# Patient Record
Sex: MALE | Race: WHITE | NOT HISPANIC OR LATINO | Employment: UNEMPLOYED | URBAN - NONMETROPOLITAN AREA
[De-identification: names, ages, dates, MRNs, and addresses within clinical notes are randomized per-mention and may not be internally consistent; named-entity substitution may affect disease eponyms.]

---
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
## 2018-04-26 RX ORDER — INSULIN GLARGINE 100 [IU]/ML
INJECTION, SOLUTION SUBCUTANEOUS
Qty: 5 PEN | Refills: 5 | Status: SHIPPED | OUTPATIENT
Start: 2018-04-26 | End: 2018-11-29 | Stop reason: SDUPTHER

## 2018-07-05 ENCOUNTER — OFFICE VISIT (OUTPATIENT)
Dept: FAMILY MEDICINE CLINIC | Facility: CLINIC | Age: 18
End: 2018-07-05

## 2018-07-05 VITALS
DIASTOLIC BLOOD PRESSURE: 74 MMHG | HEART RATE: 81 BPM | HEIGHT: 66 IN | OXYGEN SATURATION: 99 % | SYSTOLIC BLOOD PRESSURE: 122 MMHG | BODY MASS INDEX: 22.03 KG/M2 | WEIGHT: 137.1 LBS

## 2018-07-05 DIAGNOSIS — E10.8 TYPE 1 DIABETES MELLITUS WITH COMPLICATION (HCC): ICD-10-CM

## 2018-07-05 DIAGNOSIS — R56.9 SEIZURES (HCC): ICD-10-CM

## 2018-07-05 DIAGNOSIS — Z76.89 ESTABLISHING CARE WITH NEW DOCTOR, ENCOUNTER FOR: Primary | ICD-10-CM

## 2018-07-05 PROCEDURE — 99203 OFFICE O/P NEW LOW 30 MIN: CPT | Performed by: STUDENT IN AN ORGANIZED HEALTH CARE EDUCATION/TRAINING PROGRAM

## 2018-07-05 PROCEDURE — 90471 IMMUNIZATION ADMIN: CPT | Performed by: STUDENT IN AN ORGANIZED HEALTH CARE EDUCATION/TRAINING PROGRAM

## 2018-07-05 PROCEDURE — 90651 9VHPV VACCINE 2/3 DOSE IM: CPT | Performed by: STUDENT IN AN ORGANIZED HEALTH CARE EDUCATION/TRAINING PROGRAM

## 2018-07-05 NOTE — PROGRESS NOTES
ID: Tomas Novak    CC:   Chief Complaint   Patient presents with   • Establish Care     sesonal allergies    • Diabetes       Subjective:     Tomas Novak is a 17 y.o. male who presents for establish care with new doctor.    Patient is a 17-year-old male who has had type 1 diabetes since the age of 3.  He also has a history of seizures which started at 4, but he has not had any seizures since 6 years of age.  Patient is requesting a referral to neurology.  Patient has not had a diabetic eye exam for over a year.  Plan to refer to ophthalmology.  Patient states his diet mainly consists of frozen pizzas and pizza rolls.  He leads a sedentary lifestyle with minimal exercise.  Patient had abdominal pain couple weeks ago, but it resolved after he took a laxative and has not had any abdominal discomfort since then.      Diabetes   He presents for his initial diabetic visit. He has type 1 diabetes mellitus. No MedicAlert identification noted. The initial diagnosis of diabetes was made 14 years ago. His disease course has been stable. There are no hypoglycemic associated symptoms. Pertinent negatives for hypoglycemia include no confusion, dizziness, headaches, nervousness/anxiousness or seizures. There are no diabetic associated symptoms. Pertinent negatives for diabetes include no blurred vision, no chest pain, no fatigue, no foot paresthesias, no foot ulcerations, no polydipsia, no polyphagia, no polyuria, no visual change and no weakness. There are no hypoglycemic complications. Pertinent negatives for hypoglycemia complications include no blackouts, no nocturnal hypoglycemia and no required glucagon injection. Symptoms are stable. There are no diabetic complications. Pertinent negatives for diabetic complications include no autonomic neuropathy, peripheral neuropathy or retinopathy. Risk factors for coronary artery disease include diabetes mellitus, male sex and sedentary lifestyle. Current diabetic treatment  includes insulin injections. He is compliant with treatment all of the time. His weight is stable. He is following a high fat/cholesterol, generally unhealthy and high salt diet. When asked about meal planning, he reported none. He has not had a previous visit with a dietitian. He rarely participates in exercise. There is no change in his home blood glucose trend. He does not see a podiatrist.Eye exam is not current.        Preventative:  Over the past 2 weeks, have you felt down, depressed, or hopeless?No   Over the past 2 weeks, have you felt little interest or pleasure in doing things?Yes  Clinical depression screening refused by patient.No     On osteoporosis therapy?No     PHQ-9 Depression Screening  Little interest or pleasure in doing things? 1   Feeling down, depressed, or hopeless? 0   Trouble falling or staying asleep, or sleeping too much? 0   Feeling tired or having little energy? 2   Poor appetite or overeating? 0   Feeling bad about yourself - or that you are a failure or have let yourself or your family down? 2   Trouble concentrating on things, such as reading the newspaper or watching television? 0   Moving or speaking so slowly that other people could have noticed? Or the opposite - being so fidgety or restless that you have been moving around a lot more than usual? 0   Thoughts that you would be better off dead, or of hurting yourself in some way? 0   PHQ-9 Total Score 5   If you checked off any problems, how difficult have these problems made it for you to do your work, take care of things at home, or get along with other people? Somewhat difficult       Past Medical Hx:  Past Medical History:   Diagnosis Date   • Seizure disorder (CMS/[facility]) 1/5/2017   • Type 1 diabetes mellitus without complication (CMS/[facility]) 1/5/2017   • Vitamin D deficiency 1/5/2017       Past Surgical Hx:  Past Surgical History:   Procedure Laterality Date   • NO PAST SURGERIES         Health Maintenance:  Health Maintenance    Topic Date Due   • HPV VACCINES (1 of 3 - Male 3 Dose Series) 07/20/2009   • MENINGOCOCCAL VACCINE (Normal Risk) (1 of 1) 07/20/2014   • DIABETIC EYE EXAM  04/05/2017   • PNEUMOCOCCAL VACCINE (19-64 MEDIUM RISK) (1 of 1 - PPSV23) 07/20/2017   • TDAP/TD VACCINES (1 - Tdap) 07/20/2017   • INFLUENZA VACCINE  08/01/2018   • URINE MICROALBUMIN  10/20/2018   • HEMOGLOBIN A1C  01/05/2019   • DIABETIC FOOT EXAM  07/05/2019   • ANNUAL PHYSICAL  07/06/2019       Current Meds:    Current Outpatient Prescriptions:   •  glucose blood (ACCU-CHEK PATT) test strip, 8 x daily, Disp: 240 each, Rfl: 11  •  insulin aspart (NOVOLOG FLEXPEN) 100 UNIT/ML solution pen-injector sc pen, Inject 25 Units under the skin 3 (Three) Times a Day With Meals., Disp: 8 pen, Rfl: 11  •  Insulin Glargine (BASAGLAR KWIKPEN) 100 UNIT/ML injection pen, INECT 30 UNITS UNDER THE SKIN DAILY AS DIRECTED, Disp: 5 pen, Rfl: 5  •  Insulin Glargine (TOUJEO SOLOSTAR) 300 UNIT/ML solution pen-injector, Inject 30 Units under the skin Daily. Or tresiba or lantus, Disp: 1 pen, Rfl: 11  •  levETIRAcetam (KEPPRA) 500 MG tablet, Take 1 tablet by mouth Daily., Disp: 30 tablet, Rfl: 11  •  insulin aspart (novoLOG) 100 UNIT/ML injection, Inject  under the skin., Disp: , Rfl:     Allergies:  Patient has no known allergies.    Family Hx:  Family History   Problem Relation Age of Onset   • Diabetes type II Father         Social History:  Social History     Social History   • Marital status: Unknown     Spouse name: N/A   • Number of children: N/A   • Years of education: N/A     Occupational History   • Not on file.     Social History Main Topics   • Smoking status: Never Smoker   • Smokeless tobacco: Never Used   • Alcohol use Not on file   • Drug use: Unknown   • Sexual activity: Not on file     Other Topics Concern   • Not on file     Social History Narrative   • No narrative on file       Review of Systems   Constitutional: Negative for activity change, appetite change,  "chills, diaphoresis, fatigue and fever.   HENT: Negative for congestion, ear pain, rhinorrhea, sneezing and sore throat.    Eyes: Negative for blurred vision, pain, redness, itching and visual disturbance.   Respiratory: Negative for cough, choking, chest tightness, shortness of breath, wheezing and stridor.    Cardiovascular: Negative for chest pain, palpitations and leg swelling.   Gastrointestinal: Negative for abdominal distention, abdominal pain, blood in stool, constipation, diarrhea, nausea, rectal pain and vomiting.   Endocrine: Negative for polydipsia, polyphagia and polyuria.   Genitourinary: Negative for difficulty urinating, dysuria, flank pain and frequency.   Skin: Negative for color change and rash.   Neurological: Negative for dizziness, seizures, syncope, weakness, light-headedness, numbness and headaches.   Psychiatric/Behavioral: Negative for agitation, confusion, decreased concentration and sleep disturbance. The patient is not nervous/anxious.    All other systems reviewed and are negative.      Objective:     /74 (BP Location: Left arm, Patient Position: Sitting, Cuff Size: Adult)   Pulse 81   Ht 167.6 cm (66\")   Wt 62.2 kg (137 lb 1.6 oz)   SpO2 99%   BMI 22.13 kg/m²     Physical Exam   Constitutional: He is oriented to person, place, and time. He appears well-developed and well-nourished.  Non-toxic appearance. He does not have a sickly appearance. He does not appear ill. No distress.   HENT:   Head: Normocephalic and atraumatic.   Right Ear: External ear normal. No lacerations. No swelling or tenderness.   Left Ear: External ear normal. No lacerations. No swelling or tenderness.   Nose: Nose normal.   Mouth/Throat: Uvula is midline, oropharynx is clear and moist and mucous membranes are normal.   Eyes: Conjunctivae, EOM and lids are normal. Pupils are equal, round, and reactive to light. No scleral icterus.   Neck: No tracheal deviation present. No thyromegaly present. "   Cardiovascular: Normal rate, regular rhythm, normal heart sounds and intact distal pulses.  Exam reveals no gallop, no distant heart sounds and no friction rub.    No murmur heard.  Pulses:       Carotid pulses are 2+ on the right side, and 2+ on the left side.       Radial pulses are 2+ on the right side, and 2+ on the left side.        Dorsalis pedis pulses are 2+ on the right side, and 2+ on the left side.        Posterior tibial pulses are 2+ on the right side, and 2+ on the left side.   Pulmonary/Chest: Effort normal and breath sounds normal. No accessory muscle usage or stridor. No respiratory distress. He has no decreased breath sounds. He has no wheezes. He has no rhonchi. He has no rales. He exhibits no tenderness.   Abdominal: Soft. Bowel sounds are normal. He exhibits no shifting dullness, no distension and no ascites. There is no tenderness. There is no rigidity, no rebound and no guarding.   Musculoskeletal:        Right lower leg: He exhibits no swelling.        Left lower leg: He exhibits no swelling.        Right foot: There is no swelling.        Left foot: There is no swelling.    Tomas had a diabetic foot exam performed today.   During the foot exam he had a monofilament test performed.    Neurological Sensory Findings - Unaltered hot/cold right ankle/foot discrimination and unaltered hot/cold left ankle/foot discrimination. Unaltered sharp/dull right ankle/foot discrimination and unaltered sharp/dull left ankle/foot discrimination. No right ankle/foot altered proprioception and no left ankle/foot altered proprioception  Vascular Status -  His right foot exhibits normal foot vasculature  and no edema. His left foot exhibits normal foot vasculature  and no edema.  Lymphadenopathy:     He has no cervical adenopathy.   Neurological: He is alert and oriented to person, place, and time. He has normal strength. No cranial nerve deficit or sensory deficit. He exhibits normal muscle tone. GCS eye  subscore is 4. GCS verbal subscore is 5. GCS motor subscore is 6.   Skin: Skin is warm and dry. No rash noted. He is not diaphoretic. No erythema. No pallor.   Psychiatric: He has a normal mood and affect. His speech is normal.   Nursing note and vitals reviewed.       Assessment/Plan:     Tomas was seen today for establish care and diabetes.    Diagnoses and all orders for this visit:    Establishing care with new doctor, encounter for    Type 1 diabetes mellitus with complication (CMS/[facility])  -     Ambulatory Referral for Diabetic Eye Exam-Ophthalmology  -     Basic Metabolic Panel  -     Hemoglobin A1c    Seizures (CMS/[facility])  -     Ambulatory Referral to Neurology          Follow-up:     Return in about 4 weeks (around 8/2/2018) for Recheck.      Goals: keep doc appointments  Barriers to goals: pt compliance    Health Maintenance   Topic Date Due   • HPV VACCINES (1 of 3 - Male 3 Dose Series) 07/20/2009   • MENINGOCOCCAL VACCINE (Normal Risk) (1 of 1) 07/20/2014   • DIABETIC EYE EXAM  04/05/2017   • PNEUMOCOCCAL VACCINE (19-64 MEDIUM RISK) (1 of 1 - PPSV23) 07/20/2017   • TDAP/TD VACCINES (1 - Tdap) 07/20/2017   • INFLUENZA VACCINE  08/01/2018   • URINE MICROALBUMIN  10/20/2018   • HEMOGLOBIN A1C  01/05/2019   • DIABETIC FOOT EXAM  07/05/2019   • ANNUAL PHYSICAL  07/06/2019   --Patient is going to check his immunization records at home to see whether he is up-to-date on his meningococcal, TDAP, and pneumococcal vaccines.--    Tobacco: nonsmoker  Alcohol: does not drink  Lifestyle: Body mass index is 22.13 kg/m². eat more fruits and vegetables, decrease soda or juice intake, increase water intake, increase physical activity, reduce screen time, reduce portion size, cut out extra servings, reduce fast food intake, family to eat at dinner table more often, keep TV off during meals, plan meals, eat breakfast and have 3 meals a day    RISK SCORE: 3        This document has been electronically signed by Eitan Hutchison,  MD on July 5, 2018 11:16 AM

## 2018-07-09 NOTE — PROGRESS NOTES
I have seen the patient.  I have reviewed the notes, assessments, and/or procedures performed by Dr. Hutchison, I concur with her/his documentation and assessment and plan for Tomas Kellermino.       This document has been electronically signed by Madhu Meléndez MD on July 9, 2018 9:38 AM

## 2018-10-22 RX ORDER — INSULIN LISPRO 100 [IU]/ML
INJECTION, SOLUTION INTRAVENOUS; SUBCUTANEOUS
Qty: 30 ML | Refills: 0 | OUTPATIENT
Start: 2018-10-22

## 2018-10-22 RX ORDER — LEVETIRACETAM 500 MG/1
500 TABLET ORAL DAILY
Qty: 30 TABLET | Refills: 0 | OUTPATIENT
Start: 2018-10-22

## 2018-10-30 ENCOUNTER — TELEPHONE (OUTPATIENT)
Dept: FAMILY MEDICINE CLINIC | Facility: CLINIC | Age: 18
End: 2018-10-30

## 2018-10-30 RX ORDER — INSULIN ASPART 100 [IU]/ML
INJECTION, SOLUTION INTRAVENOUS; SUBCUTANEOUS
Qty: 15 ML | Refills: 0 | Status: SHIPPED | OUTPATIENT
Start: 2018-10-30 | End: 2018-11-29 | Stop reason: SDUPTHER

## 2018-11-05 RX ORDER — LEVETIRACETAM 500 MG/1
500 TABLET ORAL DAILY
Qty: 30 TABLET | Refills: 0 | Status: SHIPPED | OUTPATIENT
Start: 2018-11-05 | End: 2018-11-09 | Stop reason: SDUPTHER

## 2018-11-09 ENCOUNTER — OFFICE VISIT (OUTPATIENT)
Dept: FAMILY MEDICINE CLINIC | Facility: CLINIC | Age: 18
End: 2018-11-09

## 2018-11-09 VITALS
OXYGEN SATURATION: 97 % | HEIGHT: 66 IN | WEIGHT: 136.1 LBS | SYSTOLIC BLOOD PRESSURE: 110 MMHG | DIASTOLIC BLOOD PRESSURE: 68 MMHG | HEART RATE: 68 BPM | BODY MASS INDEX: 21.87 KG/M2

## 2018-11-09 DIAGNOSIS — G40.909 SEIZURE DISORDER (HCC): Primary | ICD-10-CM

## 2018-11-09 PROCEDURE — 99213 OFFICE O/P EST LOW 20 MIN: CPT | Performed by: STUDENT IN AN ORGANIZED HEALTH CARE EDUCATION/TRAINING PROGRAM

## 2018-11-09 RX ORDER — LEVETIRACETAM 500 MG/1
500 TABLET ORAL DAILY
Qty: 30 TABLET | Refills: 0 | Status: SHIPPED | OUTPATIENT
Start: 2018-11-09 | End: 2018-12-06 | Stop reason: SDUPTHER

## 2018-11-09 NOTE — PROGRESS NOTES
I have seen the patient.  I have reviewed the notes, assessments, and/or procedures performed by Eitan Hutchison MD , I concur with her/his documentation and assessment and plan for Tomas Padron Scarlet.          This document has been electronically signed by Ragini Lee MD on November 9, 2018 2:29 PM

## 2018-11-09 NOTE — PROGRESS NOTES
"ID: Tomas Novak    CC:   Chief Complaint   Patient presents with   • Seizures     Weakness: needs Keppra refilled today        Subjective:     Tomas Novak is a 17 y.o. male who presents for establish care with new doctor.    Patient presents to clinic for a refill of his seizure medication.  He's been on this medication for \"many years \".  He has not had a seizure for \"a long time\".  When probed further, patient states he has not had a seizure for years.  He first noticed his supply of Keppra running low one week ago.  That's when he switched to taking the medication every other day, rather than daily.  Patient ran out of his Keppra yesterday.  He reports that he can usually go 2 days off this medication before he has a seizure.    Endocrinology is managing his type 1 diabetes.         Preventative:  Over the past 2 weeks, have you felt down, depressed, or hopeless?No   Over the past 2 weeks, have you felt little interest or pleasure in doing things?No  Clinical depression screening refused by patient.No     On osteoporosis therapy?No     Past Medical Hx:  Past Medical History:   Diagnosis Date   • Seizure disorder (CMS/[facility]) 1/5/2017   • Type 1 diabetes mellitus without complication (CMS/[facility]) 1/5/2017   • Vitamin D deficiency 1/5/2017       Past Surgical Hx:  Past Surgical History:   Procedure Laterality Date   • NO PAST SURGERIES         Health Maintenance:  Health Maintenance   Topic Date Due   • MENINGOCOCCAL VACCINE (Normal Risk) (1 of 1 - 2-dose series) 07/20/2014   • TDAP/TD VACCINES (1 - Tdap) 07/20/2017   • URINE MICROALBUMIN  10/20/2018   • HPV VACCINES (2 of 2 - Male 2-dose series) 01/05/2019   • PNEUMOCOCCAL VACCINE (19-64 MEDIUM RISK) (1 of 1 - PPSV23) 11/08/2019 (Originally 7/20/2017)   • HEMOGLOBIN A1C  01/05/2019   • DIABETIC FOOT EXAM  07/05/2019   • ANNUAL PHYSICAL  07/06/2019   • DIABETIC EYE EXAM  10/24/2019   • INFLUENZA VACCINE  Addressed       Current Meds:    Current " Outpatient Prescriptions:   •  glucose blood (ACCU-CHEK PATT) test strip, 8 x daily, Disp: 240 each, Rfl: 11  •  insulin aspart (novoLOG) 100 UNIT/ML injection, Inject  under the skin., Disp: , Rfl:   •  Insulin Glargine (BASAGLAR KWIKPEN) 100 UNIT/ML injection pen, INECT 30 UNITS UNDER THE SKIN DAILY AS DIRECTED, Disp: 5 pen, Rfl: 5  •  Insulin Glargine (TOUJEO SOLOSTAR) 300 UNIT/ML solution pen-injector, Inject 30 Units under the skin Daily. Or tresiba or lantus, Disp: 1 pen, Rfl: 11  •  levETIRAcetam (KEPPRA) 500 MG tablet, Take 1 tablet by mouth Daily., Disp: 30 tablet, Rfl: 0  •  NOVOLOG FLEXPEN 100 UNIT/ML solution pen-injector sc pen, ADMINISTER 25 UNITS UNDER THE SKIN THREE TIMES DAILY WITH MEALS, Disp: 15 mL, Rfl: 0    Allergies:  Patient has no known allergies.    Family Hx:  Family History   Problem Relation Age of Onset   • Diabetes type II Father         Social History:  Social History     Social History   • Marital status: Unknown     Spouse name: N/A   • Number of children: N/A   • Years of education: N/A     Occupational History   • Not on file.     Social History Main Topics   • Smoking status: Never Smoker   • Smokeless tobacco: Never Used   • Alcohol use Defer   • Drug use: Unknown   • Sexual activity: Defer     Other Topics Concern   • Not on file     Social History Narrative   • No narrative on file       Review of Systems   Constitutional: Negative for activity change, appetite change, chills, diaphoresis and fever.   HENT: Negative for congestion, ear pain, rhinorrhea and sneezing.    Eyes: Negative for pain, redness and itching.   Respiratory: Negative for choking, chest tightness, shortness of breath, wheezing and stridor.    Cardiovascular: Negative for palpitations and leg swelling.   Gastrointestinal: Negative for abdominal distention, abdominal pain, blood in stool, constipation and rectal pain.   Genitourinary: Negative for difficulty urinating, dysuria, flank pain and frequency.  "  Skin: Negative for color change and rash.   Neurological: Negative for seizures, syncope, light-headedness and numbness.   Psychiatric/Behavioral: Negative for agitation, decreased concentration and sleep disturbance.   All other systems reviewed and are negative.      Objective:     /68 (BP Location: Right arm, Patient Position: Sitting, Cuff Size: Adult)   Pulse 68   Ht 167.6 cm (66\")   Wt 61.7 kg (136 lb 1.6 oz)   SpO2 97%   BMI 21.97 kg/m²     Physical Exam   Constitutional: He is oriented to person, place, and time. He appears well-developed and well-nourished. He is active.  Non-toxic appearance. He does not have a sickly appearance. He does not appear ill. No distress.   HENT:   Head: Normocephalic.   Right Ear: External ear normal. No lacerations. No swelling or tenderness.   Left Ear: External ear normal. No lacerations. No swelling or tenderness.   Nose: Nose normal.   Mouth/Throat: Uvula is midline, oropharynx is clear and moist and mucous membranes are normal.   Eyes: Pupils are equal, round, and reactive to light. Conjunctivae, EOM and lids are normal.   Cardiovascular: Normal heart sounds and intact distal pulses.    Pulmonary/Chest: Effort normal and breath sounds normal. No accessory muscle usage. No respiratory distress. He has no decreased breath sounds. He has no wheezes. He has no rhonchi. He has no rales. He exhibits no tenderness.   Abdominal: Soft. Bowel sounds are normal. There is no tenderness. There is no rigidity, no rebound and no guarding.   Musculoskeletal:        Right lower leg: He exhibits no swelling.        Left lower leg: He exhibits no swelling.        Right foot: There is no swelling.        Left foot: There is no swelling.     Vascular Status -  His right foot exhibits no edema. His left foot exhibits no edema.  Lymphadenopathy:     He has no cervical adenopathy.   Neurological: He is alert and oriented to person, place, and time. He has normal strength. He is not " disoriented. No cranial nerve deficit (grossly intact). He exhibits normal muscle tone. GCS eye subscore is 4. GCS verbal subscore is 5. GCS motor subscore is 6.   Skin: Skin is warm and dry. Capillary refill takes less than 2 seconds. No rash noted. He is not diaphoretic.   Nursing note and vitals reviewed.       Assessment/Plan:     Tomas was seen today for seizures.    Diagnoses and all orders for this visit:    Seizure disorder (CMS/HCC)  -     levETIRAcetam (KEPPRA) 500 MG tablet; Take 1 tablet by mouth Daily.          Follow-up:     Return if symptoms worsen or fail to improve.      Goals: keep doc appointments  Barriers to goals: pt compliance    Health Maintenance   Topic Date Due   • MENINGOCOCCAL VACCINE (Normal Risk) (1 of 1 - 2-dose series) 07/20/2014   • TDAP/TD VACCINES (1 - Tdap) 07/20/2017   • URINE MICROALBUMIN  10/20/2018   • HPV VACCINES (2 of 2 - Male 2-dose series) 01/05/2019   • PNEUMOCOCCAL VACCINE (19-64 MEDIUM RISK) (1 of 1 - PPSV23) 11/08/2019 (Originally 7/20/2017)   • HEMOGLOBIN A1C  01/05/2019   • DIABETIC FOOT EXAM  07/05/2019   • ANNUAL PHYSICAL  07/06/2019   • DIABETIC EYE EXAM  10/24/2019   • INFLUENZA VACCINE  Addressed   --Patient is going on check his immunization records at home to see whether he is up-to-date on his meningococcal, TDAP, and pneumococcal vaccines.--     Tobacco: nonsmoker  Alcohol: does not drink  Lifestyle: Body mass index is 21.97 kg/m². eat more fruits and vegetables, decrease soda or juice intake, increase water intake, increase physical activity, reduce screen time, reduce portion size, cut out extra servings, reduce fast food intake, family to eat at dinner table more often, keep TV off during meals, plan meals, eat breakfast and have 3 meals a day    RISK SCORE: 3        This document has been electronically signed by Eitan Hutchison MD on November 9, 2018 3:39 PM

## 2018-11-28 ENCOUNTER — TELEPHONE (OUTPATIENT)
Dept: FAMILY MEDICINE CLINIC | Facility: CLINIC | Age: 18
End: 2018-11-28

## 2018-11-28 NOTE — TELEPHONE ENCOUNTER
Pt called and left voicemail and he is needing refills on insulin aspart (novoLOG) 100 UNIT/ML injection and glucose blood (ACCU-CHEK PATT) test strip called into [facility] on [facility] in [place].      Thank You

## 2018-11-29 RX ORDER — INSULIN GLARGINE 100 [IU]/ML
INJECTION, SOLUTION SUBCUTANEOUS
Qty: 5 PEN | Refills: 5 | Status: SHIPPED | OUTPATIENT
Start: 2018-11-29 | End: 2019-03-08 | Stop reason: SDUPTHER

## 2018-12-06 ENCOUNTER — OFFICE VISIT (OUTPATIENT)
Dept: FAMILY MEDICINE CLINIC | Facility: CLINIC | Age: 18
End: 2018-12-06

## 2018-12-06 VITALS
BODY MASS INDEX: 21.87 KG/M2 | WEIGHT: 136.1 LBS | DIASTOLIC BLOOD PRESSURE: 80 MMHG | HEART RATE: 78 BPM | HEIGHT: 66 IN | SYSTOLIC BLOOD PRESSURE: 124 MMHG | OXYGEN SATURATION: 98 %

## 2018-12-06 DIAGNOSIS — E10.9 TYPE 1 DIABETES MELLITUS WITHOUT COMPLICATION (HCC): ICD-10-CM

## 2018-12-06 DIAGNOSIS — G40.909 SEIZURE DISORDER (HCC): Primary | ICD-10-CM

## 2018-12-06 PROCEDURE — 99213 OFFICE O/P EST LOW 20 MIN: CPT | Performed by: STUDENT IN AN ORGANIZED HEALTH CARE EDUCATION/TRAINING PROGRAM

## 2018-12-06 RX ORDER — LEVETIRACETAM 500 MG/1
500 TABLET ORAL DAILY
Qty: 30 TABLET | Refills: 5 | Status: SHIPPED | OUTPATIENT
Start: 2018-12-06 | End: 2019-06-11 | Stop reason: SDUPTHER

## 2018-12-06 NOTE — PROGRESS NOTES
"ID: Tomas Novak    CC:   Chief Complaint   Patient presents with   • Seizures     med refills        Subjective:     Tomas Novak is a 17 y.o. male who presents for seizure medication refills.    Patient presents to clinic for a refill of his seizure medication.  He's been on this medication for \"many years \".  He has not had a seizure for \"a long time\".  When probed further, patient states he has not had a seizure for 10 years.  He he was tried to be weaned off his Keppra once before 10 years ago, but had a seizure 3 days after he came off of it.  He is hesitant to come off his medication now.    Endocrinology is managing his type 1 diabetes.         Preventative:  Over the past 2 weeks, have you felt down, depressed, or hopeless?No   Over the past 2 weeks, have you felt little interest or pleasure in doing things?No  Clinical depression screening refused by patient.No     On osteoporosis therapy?No     Past Medical Hx:  Past Medical History:   Diagnosis Date   • Seizure disorder (CMS/[facility]) 1/5/2017   • Type 1 diabetes mellitus without complication (CMS/[facility]) 1/5/2017   • Vitamin D deficiency 1/5/2017       Past Surgical Hx:  Past Surgical History:   Procedure Laterality Date   • NO PAST SURGERIES         Health Maintenance:  Health Maintenance   Topic Date Due   • MENINGOCOCCAL VACCINE (Normal Risk) (1 - 2-dose series) 07/20/2014   • HEPATITIS A VACCINE ADULT (1 of 2) 07/20/2016   • TDAP/TD VACCINES (1 - Tdap) 07/20/2017   • HPV VACCINES (2 - Male 3-dose series) 08/30/2018   • URINE MICROALBUMIN  10/20/2018   • PNEUMOCOCCAL VACCINE (19-64 MEDIUM RISK) (1 of 1 - PPSV23) 11/08/2019 (Originally 7/20/2017)   • HEMOGLOBIN A1C  01/05/2019   • DIABETIC FOOT EXAM  07/05/2019   • ANNUAL PHYSICAL  07/06/2019   • DIABETIC EYE EXAM  10/24/2019   • INFLUENZA VACCINE  Addressed       Current Meds:    Current Outpatient Medications:   •  glucose blood (ACCU-CHEK PATT) test strip, 8 x daily, Disp: 240 each, Rfl: " 11  •  insulin aspart (NOVOLOG FLEXPEN) 100 UNIT/ML solution pen-injector sc pen, ADMINISTER 25 UNITS UNDER THE SKIN THREE TIMES DAILY WITH MEALS, Disp: 15 mL, Rfl: 0  •  insulin aspart (novoLOG) 100 UNIT/ML injection, Inject  under the skin., Disp: , Rfl:   •  Insulin Glargine (BASAGLAR KWIKPEN) 100 UNIT/ML injection pen, Inject 30 units under the skin daily as directed, Disp: 5 pen, Rfl: 5  •  Insulin Glargine (TOUJEO SOLOSTAR) 300 UNIT/ML solution pen-injector, Inject 30 Units under the skin Daily. Or tresiba or lantus, Disp: 1 pen, Rfl: 11  •  levETIRAcetam (KEPPRA) 500 MG tablet, Take 1 tablet by mouth Daily., Disp: 30 tablet, Rfl: 5    Allergies:  Patient has no known allergies.    Family Hx:  Family History   Problem Relation Age of Onset   • Diabetes type II Father         Social History:  Social History     Socioeconomic History   • Marital status: Unknown     Spouse name: Not on file   • Number of children: Not on file   • Years of education: Not on file   • Highest education level: Not on file   Social Needs   • Financial resource strain: Not on file   • Food insecurity - worry: Not on file   • Food insecurity - inability: Not on file   • Transportation needs - medical: Not on file   • Transportation needs - non-medical: Not on file   Occupational History   • Not on file   Tobacco Use   • Smoking status: Never Smoker   • Smokeless tobacco: Never Used   Substance and Sexual Activity   • Alcohol use: Defer   • Drug use: Defer   • Sexual activity: Defer   Other Topics Concern   • Not on file   Social History Narrative   • Not on file       Review of Systems   Constitutional: Negative for activity change, appetite change, chills, diaphoresis and fever.   HENT: Negative for congestion, ear pain, rhinorrhea and sneezing.    Eyes: Negative for pain, redness and itching.   Respiratory: Negative for choking, chest tightness, shortness of breath, wheezing and stridor.    Cardiovascular: Negative for palpitations  "and leg swelling.   Gastrointestinal: Negative for abdominal distention, abdominal pain, blood in stool, constipation and rectal pain.   Genitourinary: Negative for difficulty urinating, dysuria, flank pain and frequency.   Skin: Negative for color change and rash.   Neurological: Negative for seizures, syncope, light-headedness and numbness.   Psychiatric/Behavioral: Negative for agitation, decreased concentration and sleep disturbance.   All other systems reviewed and are negative.      Objective:     /80 (BP Location: Left arm, Patient Position: Sitting, Cuff Size: Adult)   Pulse 78   Ht 167.6 cm (66\")   Wt 61.7 kg (136 lb 1.6 oz)   SpO2 98%   BMI 21.97 kg/m²     Physical Exam   Constitutional: He is oriented to person, place, and time. He appears well-developed and well-nourished. He is active.  Non-toxic appearance. He does not have a sickly appearance. He does not appear ill. No distress.   HENT:   Head: Normocephalic.   Right Ear: External ear normal. No lacerations. No swelling or tenderness.   Left Ear: External ear normal. No lacerations. No swelling or tenderness.   Nose: Nose normal.   Mouth/Throat: Uvula is midline, oropharynx is clear and moist and mucous membranes are normal.   Eyes: Conjunctivae, EOM and lids are normal. Pupils are equal, round, and reactive to light.   Cardiovascular: Normal heart sounds and intact distal pulses.   Pulmonary/Chest: Effort normal and breath sounds normal. No accessory muscle usage. No respiratory distress. He has no decreased breath sounds. He has no wheezes. He has no rhonchi. He has no rales. He exhibits no tenderness.   Abdominal: Soft. Bowel sounds are normal. There is no tenderness. There is no rigidity, no rebound and no guarding.   Musculoskeletal:        Right lower leg: He exhibits no swelling.        Left lower leg: He exhibits no swelling.        Right foot: There is no swelling.        Left foot: There is no swelling.     Vascular Status -  His " right foot exhibits no edema. His left foot exhibits no edema.  Lymphadenopathy:     He has no cervical adenopathy.   Neurological: He is alert and oriented to person, place, and time. He has normal strength. He is not disoriented. No cranial nerve deficit (grossly intact). He exhibits normal muscle tone. GCS eye subscore is 4. GCS verbal subscore is 5. GCS motor subscore is 6.   Skin: Skin is warm and dry. Capillary refill takes less than 2 seconds. No rash noted. He is not diaphoretic.   Nursing note and vitals reviewed.       Assessment/Plan:     Tomas was seen today for seizures.    Diagnoses and all orders for this visit:    Seizure disorder (CMS/[facility])  -     levETIRAcetam (KEPPRA) 500 MG tablet; Take 1 tablet by mouth Daily.    Type 1 diabetes mellitus without complication (CMS/[facility])  -     glucose blood (ACCU-CHEK PATT) test strip; 8 x daily    Other orders  -     Cancel: insulin aspart (NOVOLOG FLEXPEN) 100 UNIT/ML solution pen-injector sc pen; ADMINISTER 25 UNITS UNDER THE SKIN THREE TIMES DAILY WITH MEALS  -     Cancel: Insulin Glargine (TOUJEO SOLOSTAR) 300 UNIT/ML solution pen-injector; Inject 30 Units under the skin into the appropriate area as directed Daily. Or tresiba or lantus          Follow-up:     Return in about 6 months (around 6/6/2019), or if symptoms worsen or fail to improve, for Recheck Seizure.      Goals: keep doc appointments  Barriers to goals: pt compliance    Health Maintenance   Topic Date Due   • MENINGOCOCCAL VACCINE (Normal Risk) (1 - 2-dose series) 07/20/2014   • HEPATITIS A VACCINE ADULT (1 of 2) 07/20/2016   • TDAP/TD VACCINES (1 - Tdap) 07/20/2017   • HPV VACCINES (2 - Male 3-dose series) 08/30/2018   • URINE MICROALBUMIN  10/20/2018   • PNEUMOCOCCAL VACCINE (19-64 MEDIUM RISK) (1 of 1 - PPSV23) 11/08/2019 (Originally 7/20/2017)   • HEMOGLOBIN A1C  01/05/2019   • DIABETIC FOOT EXAM  07/05/2019   • ANNUAL PHYSICAL  07/06/2019   • DIABETIC EYE EXAM  10/24/2019   • INFLUENZA  VACCINE  Addressed   --Patient is going on check his immunization records at home to see whether he is up-to-date on his meningococcal, TDAP, and pneumococcal vaccines.--     Tobacco: nonsmoker  Alcohol: does not drink  Lifestyle: Body mass index is 21.97 kg/m². eat more fruits and vegetables, decrease soda or juice intake, increase water intake, increase physical activity, reduce screen time, reduce portion size, cut out extra servings, reduce fast food intake, family to eat at dinner table more often, keep TV off during meals, plan meals, eat breakfast and have 3 meals a day    RISK SCORE: 3        This document has been electronically signed by Eitan Hutchison MD on December 6, 2018 2:16 PM

## 2018-12-07 NOTE — PROGRESS NOTES
I have seen the patient.  I have reviewed the notes, assessments, and/or procedures performed by the resident, I concur with her/his documentation and assessment and plan for Tomas Novak.      This document has been electronically signed by Hector Seymour MD on December 7, 2018 11:29 AM

## 2018-12-28 ENCOUNTER — TELEPHONE (OUTPATIENT)
Dept: FAMILY MEDICINE CLINIC | Facility: CLINIC | Age: 18
End: 2018-12-28

## 2018-12-28 NOTE — TELEPHONE ENCOUNTER
Pt called and was needing a refill on insulin aspart (NOVOLOG FLEXPEN) 100 UNIT/ML solution pen-injector sc pen. He said that Dr. Hutchison was suppose to send this in but the pharmacy hasnt received anything yet. He would like for this sent into [facility] on [facility] and would also would like a call back when this has been done.      Thank You,    Ana Laura

## 2019-03-08 ENCOUNTER — OFFICE VISIT (OUTPATIENT)
Dept: ENDOCRINOLOGY | Facility: CLINIC | Age: 19
End: 2019-03-08

## 2019-03-08 VITALS
HEART RATE: 62 BPM | WEIGHT: 136 LBS | BODY MASS INDEX: 21.86 KG/M2 | SYSTOLIC BLOOD PRESSURE: 118 MMHG | DIASTOLIC BLOOD PRESSURE: 76 MMHG | HEIGHT: 66 IN

## 2019-03-08 DIAGNOSIS — E55.9 VITAMIN D DEFICIENCY: ICD-10-CM

## 2019-03-08 DIAGNOSIS — E10.9 TYPE 1 DIABETES MELLITUS WITHOUT COMPLICATION (HCC): Primary | ICD-10-CM

## 2019-03-08 PROCEDURE — 99214 OFFICE O/P EST MOD 30 MIN: CPT | Performed by: INTERNAL MEDICINE

## 2019-03-08 RX ORDER — INSULIN GLARGINE 100 [IU]/ML
INJECTION, SOLUTION SUBCUTANEOUS
Qty: 3 PEN | Refills: 5 | Status: SHIPPED | OUTPATIENT
Start: 2019-03-08 | End: 2019-03-29 | Stop reason: SDUPTHER

## 2019-03-12 RX ORDER — ERGOCALCIFEROL 1.25 MG/1
CAPSULE ORAL
Qty: 4 CAPSULE | Refills: 0 | Status: SHIPPED | OUTPATIENT
Start: 2019-03-12 | End: 2019-06-12 | Stop reason: SDUPTHER

## 2019-03-25 NOTE — PROGRESS NOTES
" Tomas Novak is a 18 y.o. male who presents for  evaluation of   Chief Complaint   Patient presents with   • Diabetes     dominga         Primary Care / Referring Provider  Eitan Hutchison MD      Duration since 3 years old    Timing - Diabetes is Constant    Quality -  poorly controlled    Severity -  moderate    Complications - none    Current symptoms/problems  none     Alleviating Factors: Compliance      Side Effects  none    Current diet  in general, a \"healthy\" diet      Current exercise exercise    Current monitoring regimen: home blood tests - 4 times daily  Home blood sugar records:     Hypoglycemia nocturnal, exertional , if skipped meals  and has had syncope     Past Medical History:   Diagnosis Date   • Seizure disorder (CMS/[facility]) 1/5/2017   • Type 1 diabetes mellitus without complication (CMS/[facility]) 1/5/2017   • Vitamin D deficiency 1/5/2017     Family History   Problem Relation Age of Onset   • Diabetes type II Father      Social History     Tobacco Use   • Smoking status: Never Smoker   • Smokeless tobacco: Never Used   Substance Use Topics   • Alcohol use: Defer   • Drug use: Defer         Current Outpatient Medications:   •  glucose blood (ACCU-CHEK PATT) test strip, 8 x daily, Disp: 240 each, Rfl: 11  •  insulin aspart (NOVOLOG FLEXPEN) 100 UNIT/ML solution pen-injector sc pen, ADMINISTER 25 UNITS UNDER THE SKIN THREE TIMES DAILY WITH MEALS, Disp: 8 pen, Rfl: 11  •  Insulin Glargine (BASAGLAR KWIKPEN) 100 UNIT/ML injection pen, Inject 30 units under the skin daily as directed, Disp: 3 pen, Rfl: 5  •  levETIRAcetam (KEPPRA) 500 MG tablet, Take 1 tablet by mouth Daily., Disp: 30 tablet, Rfl: 5  •  vitamin D (ERGOCALCIFEROL) 65714 units capsule capsule, TAKE 1 CAPSULE BY MOUTH EVERY 7 DAYS, Disp: 4 capsule, Rfl: 0    Review of Systems    Review of Systems   Constitutional: Negative for activity change, appetite change, chills, diaphoresis, fatigue, fever and unexpected weight change. " "  HENT: Negative for congestion, drooling, ear discharge, ear pain, facial swelling, mouth sores, nosebleeds, postnasal drip, rhinorrhea, sinus pressure, sneezing, sore throat, tinnitus, trouble swallowing and voice change.    Eyes: Negative for photophobia, pain, discharge, redness, itching and visual disturbance.   Respiratory: Negative for apnea, cough, choking, chest tightness, shortness of breath, wheezing and stridor.    Cardiovascular: Negative for chest pain, palpitations and leg swelling.   Gastrointestinal: Negative for abdominal distention, abdominal pain, anal bleeding, blood in stool, constipation, diarrhea, nausea, rectal pain and vomiting.   Endocrine: Negative for cold intolerance, heat intolerance, polydipsia, polyphagia and polyuria.   Genitourinary: Negative for difficulty urinating, dysuria, enuresis, flank pain, frequency, hematuria and urgency.   Musculoskeletal: Negative for arthralgias, back pain, gait problem, joint swelling, myalgias, neck pain and neck stiffness.   Skin: Negative for color change, pallor and wound.   Allergic/Immunologic: Negative for environmental allergies, food allergies and immunocompromised state.   Neurological: Negative for dizziness, tremors, seizures, syncope, facial asymmetry, speech difficulty, weakness, light-headedness, numbness and headaches.   Hematological: Negative for adenopathy. Does not bruise/bleed easily.   Psychiatric/Behavioral: Negative for agitation, behavioral problems, confusion, decreased concentration, dysphoric mood, hallucinations, self-injury, sleep disturbance and suicidal ideas. The patient is not nervous/anxious and is not hyperactive.         Objective:     /76 (BP Location: Right arm, Patient Position: Sitting, Cuff Size: Adult)   Pulse 62   Ht 167.6 cm (66\")   Wt 61.7 kg (136 lb)   BMI 21.95 kg/m²     Physical Exam   Constitutional: He is oriented to person, place, and time. He appears well-developed and well-nourished. He " is cooperative.   HENT:   Head: Normocephalic and atraumatic.   Right Ear: External ear normal.   Left Ear: External ear normal.   Nose: Nose normal.   Mouth/Throat: Oropharynx is clear and moist. No oropharyngeal exudate.   Eyes: Conjunctivae and EOM are normal. Pupils are equal, round, and reactive to light. No scleral icterus. Right eye exhibits normal extraocular motion. Left eye exhibits normal extraocular motion.   Neck: Neck supple. No JVD present. No muscular tenderness present. No tracheal deviation, no edema and no erythema present. No thyromegaly present.   Cardiovascular: Normal rate, regular rhythm, normal heart sounds and intact distal pulses. Exam reveals no gallop and no friction rub.   No murmur heard.  Pulmonary/Chest: Effort normal and breath sounds normal. No stridor. No respiratory distress. He has no decreased breath sounds. He has no wheezes. He has no rhonchi. He has no rales. He exhibits no tenderness.   Abdominal: Soft. Bowel sounds are normal. He exhibits no distension and no mass. There is no hepatomegaly. There is no tenderness. There is no rebound and no guarding. No hernia.   Musculoskeletal: Normal range of motion. He exhibits no edema, tenderness or deformity.   Lymphadenopathy:     He has no cervical adenopathy.   Neurological: He is alert and oriented to person, place, and time. He has normal reflexes. No cranial nerve deficit. He exhibits normal muscle tone. Coordination normal.   Skin: Skin is warm. No rash noted. No erythema. No pallor.   Psychiatric: He has a normal mood and affect. His behavior is normal. Judgment and thought content normal.   Nursing note and vitals reviewed.      Lab Review    Results for orders placed or performed in visit on 10/23/17   POC Glucose Fingerstick   Result Value Ref Range    Glucose 118 70 - 130 mg/dL           Assessment/Plan       ICD-10-CM ICD-9-CM   1. Type 1 diabetes mellitus without complication (CMS/HCC) E10.9 250.01   2. Vitamin D  deficiency E55.9 268.9       Glycemic Management:   Lab Results   Component Value Date    HGBA1C 8.0 (H) 10/20/2017     Lab Results   Component Value Date    GLUCOSE 220 (H) 10/20/2017    BUN 8 10/20/2017    CREATININE 0.64 (L) 10/20/2017    EGFRIFNONA 161 10/20/2017    BCR 12.5 10/20/2017    CO2 29.0 10/20/2017    CALCIUM 9.4 10/20/2017    ALBUMIN 4.40 10/20/2017    AST 29 10/20/2017    ALT 21 10/20/2017     Lab Results   Component Value Date    WBC 4.37 10/20/2017    HGB 15.5 10/20/2017    HCT 46.3 10/20/2017    MCV 83.4 10/20/2017     10/20/2017       1 unit per 5 grams    8 units per 100 or 1 to 12 either way     --      lantus 20 units , his fasting readings are very variable, 50 to 250 and in part this is due to variability of lantus  Change to toujeo    Not interested in dexcom or pump    I hope he agrees to cgms in the future    He is testing 8 x daily       Lipid Management  Lab Results   Component Value Date    CHOL 135 10/20/2017     Lab Results   Component Value Date    TRIG 78 10/20/2017     Lab Results   Component Value Date    HDL 38 (L) 10/20/2017     No components found for: LDLCALC  Lab Results   Component Value Date    LDL 86 10/20/2017           Blood Pressure Management:    Vitals:    03/08/19 0856   BP: 118/76   Pulse: 62     Lab Results   Component Value Date    GLUCOSE 220 (H) 10/20/2017    CALCIUM 9.4 10/20/2017     10/20/2017    K 4.3 10/20/2017    CO2 29.0 10/20/2017    CL 98 10/20/2017    BUN 8 10/20/2017    CREATININE 0.64 (L) 10/20/2017    EGFRIFNONA 161 10/20/2017    BCR 12.5 10/20/2017    ANIONGAP 13.0 10/20/2017     Lab Results   Component Value Date    GLUCOSE 220 (H) 10/20/2017    BUN 8 10/20/2017    CREATININE 0.64 (L) 10/20/2017    EGFRIFNONA 161 10/20/2017    BCR 12.5 10/20/2017    CO2 29.0 10/20/2017    CALCIUM 9.4 10/20/2017    ALBUMIN 4.40 10/20/2017    AST 29 10/20/2017    ALT 21 10/20/2017             Microvascular Complication Monitoring:      No complications            Immunizations:      Up to date     Preventive Care:      Nonsmoking     Weight Related:   Wt Readings from Last 3 Encounters:   03/08/19 61.7 kg (136 lb)   12/06/18 61.7 kg (136 lb 1.6 oz)   11/09/18 61.7 kg (136 lb 1.6 oz)     Body mass index is 21.95 kg/m².        Bone Health    Lab Results   Component Value Date    CALCIUM 9.4 10/20/2017       Assess vit D since on seizure meds.     Start vit D 50 th units weekly       Thyroid Health  Lab Results   Component Value Date    TSH 1.230 10/20/2017           Other Diabetes Related Aspects       Lab Results   Component Value Date    FKCJCNSN14 414 10/20/2017           Seizure disorder    Refer to neurology  I will refill his keppra 500 mg daily since I don't want him to run out but he needs neurologist.       I reviewed and summarized records from Eitan Hutchison MD from 2016 and I reviewed / ordered labs.     Orders Placed This Encounter   Procedures   • CBC Auto Differential   • Comprehensive Metabolic Panel   • Hemoglobin A1c   • Lipid Panel   • Microalbumin / Creatinine Urine Ratio - Urine, Clean Catch   • TSH   • Vitamin B12   • Vitamin D 25 Hydroxy         A copy of my note was sent to Eitan Hutchison MD    Please see my above opinion and suggestions.

## 2019-03-29 ENCOUNTER — TELEPHONE (OUTPATIENT)
Dept: ENDOCRINOLOGY | Facility: CLINIC | Age: 19
End: 2019-03-29

## 2019-03-29 RX ORDER — INSULIN GLARGINE 100 [IU]/ML
INJECTION, SOLUTION SUBCUTANEOUS
Qty: 3 PEN | Refills: 5 | Status: SHIPPED | OUTPATIENT
Start: 2019-03-29 | End: 2019-10-02

## 2019-04-01 ENCOUNTER — TELEPHONE (OUTPATIENT)
Dept: ENDOCRINOLOGY | Facility: CLINIC | Age: 19
End: 2019-04-01

## 2019-06-11 ENCOUNTER — OFFICE VISIT (OUTPATIENT)
Dept: FAMILY MEDICINE CLINIC | Facility: CLINIC | Age: 19
End: 2019-06-11

## 2019-06-11 VITALS
WEIGHT: 132.7 LBS | OXYGEN SATURATION: 98 % | HEART RATE: 93 BPM | HEIGHT: 66 IN | BODY MASS INDEX: 21.33 KG/M2 | SYSTOLIC BLOOD PRESSURE: 118 MMHG | DIASTOLIC BLOOD PRESSURE: 70 MMHG

## 2019-06-11 DIAGNOSIS — G40.909 SEIZURE DISORDER (HCC): ICD-10-CM

## 2019-06-11 PROCEDURE — 99213 OFFICE O/P EST LOW 20 MIN: CPT | Performed by: STUDENT IN AN ORGANIZED HEALTH CARE EDUCATION/TRAINING PROGRAM

## 2019-06-11 RX ORDER — LEVETIRACETAM 500 MG/1
500 TABLET ORAL DAILY
Qty: 30 TABLET | Refills: 5 | Status: SHIPPED | OUTPATIENT
Start: 2019-06-11 | End: 2019-10-02 | Stop reason: SDUPTHER

## 2019-06-11 NOTE — PROGRESS NOTES
ID: Tomas Novak    CC:   Chief Complaint   Patient presents with   • Seizures     6 month rechk        Subjective:     Tomas Novak is a 17 y.o. male who presents for seizure medication refills.    Patient presents to clinic for a refill of his seizure medication.  He's been on this medication for years.  He has not had a seizure for several years. He he was tried to be weaned off his Keppra once before 10 years ago, but had a seizure 3 days after he came off of it.  He is hesitant to come off his medication now. Would like a refill today.    Endocrinology is managing his type 1 diabetes.        Past Medical Hx:  Past Medical History:   Diagnosis Date   • Seizure disorder (CMS/HCC) 1/5/2017   • Type 1 diabetes mellitus without complication (CMS/[facility]) 1/5/2017   • Vitamin D deficiency 1/5/2017       Past Surgical Hx:  Past Surgical History:   Procedure Laterality Date   • NO PAST SURGERIES         Health Maintenance:  Health Maintenance   Topic Date Due   • MENINGOCOCCAL VACCINE (Normal Risk) (1 - 2-dose series) 07/20/2014   • TDAP/TD VACCINES (1 - Tdap) 07/20/2017   • HPV VACCINES (2 - Male 3-dose series) 08/02/2018   • URINE MICROALBUMIN  10/20/2018   • HEMOGLOBIN A1C  01/05/2019   • PNEUMOCOCCAL VACCINE (19-64 MEDIUM RISK) (1 of 1 - PPSV23) 11/08/2019 (Originally 7/20/2017)   • DIABETIC FOOT EXAM  07/05/2019   • ANNUAL PHYSICAL  07/06/2019   • INFLUENZA VACCINE  08/01/2019   • DIABETIC EYE EXAM  10/24/2019       Current Meds:    Current Outpatient Medications:   •  glucose blood (ACCU-CHEK PATT) test strip, 8 x daily, Disp: 240 each, Rfl: 11  •  Insulin Glargine (BASAGLAR KWIKPEN) 100 UNIT/ML injection pen, Inject 30 units under the skin daily as directed, Disp: 3 pen, Rfl: 5  •  Insulin Lispro (ADMELOG SOLOSTAR) 100 UNIT/ML solution pen-injector, ADMINISTER 25 UNITS UNDER THE SKIN THREE TIMES DAILY WITH MEALS, Disp: 5 pen, Rfl: 5  •  Insulin Pen Needle (B-D UF III MINI PEN NEEDLES) 31G X 5 MM  "misc, Use to inject insulin 4 times per day, Disp: 200 each, Rfl: 11  •  levETIRAcetam (KEPPRA) 500 MG tablet, Take 1 tablet by mouth Daily., Disp: 30 tablet, Rfl: 5  •  vitamin D (ERGOCALCIFEROL) 29620 units capsule capsule, TAKE 1 CAPSULE BY MOUTH EVERY 7 DAYS, Disp: 4 capsule, Rfl: 0    Allergies:  Patient has no known allergies.    Family Hx:  Family History   Problem Relation Age of Onset   • Diabetes type II Father         Social History:  Social History     Socioeconomic History   • Marital status: Unknown     Spouse name: Not on file   • Number of children: Not on file   • Years of education: Not on file   • Highest education level: Not on file   Tobacco Use   • Smoking status: Never Smoker   • Smokeless tobacco: Never Used   Substance and Sexual Activity   • Alcohol use: Defer   • Drug use: Defer   • Sexual activity: Defer       Review of Systems   Constitutional: Negative for chills, diaphoresis, fatigue and fever.   HENT: Negative for congestion and rhinorrhea.    Eyes: Negative for discharge and redness.   Respiratory: Negative for chest tightness, shortness of breath and wheezing.    Cardiovascular: Negative for palpitations and leg swelling.   Gastrointestinal: Negative for abdominal pain and constipation.   Genitourinary: Negative for dysuria and flank pain.   Skin: Negative for color change and rash.   Neurological: Negative for dizziness.   Psychiatric/Behavioral: Negative for agitation. The patient is not nervous/anxious.        Objective:     /70   Pulse 93   Ht 167.6 cm (66\")   Wt 60.2 kg (132 lb 11.2 oz)   SpO2 98%   BMI 21.42 kg/m²     Physical Exam   Constitutional: He is oriented to person, place, and time. He appears well-developed and well-nourished. He is active.  Non-toxic appearance. He does not have a sickly appearance. He does not appear ill. No distress.   HENT:   Head: Normocephalic.   Right Ear: External ear normal.   Left Ear: External ear normal.   Nose: Nose normal. No " mucosal edema or rhinorrhea.   Mouth/Throat: Uvula is midline, oropharynx is clear and moist and mucous membranes are normal.   Eyes: Conjunctivae are normal. No scleral icterus.   Cardiovascular: Intact distal pulses.   Pulmonary/Chest: Effort normal and breath sounds normal. No accessory muscle usage. No respiratory distress. He has no decreased breath sounds. He has no wheezes. He exhibits no tenderness.   Abdominal: Soft. Bowel sounds are normal. There is no tenderness (deep palpation). There is no rigidity, no rebound and no guarding.   Neurological: He is alert and oriented to person, place, and time. He is not disoriented. No cranial nerve deficit (grossly intact). GCS eye subscore is 4. GCS verbal subscore is 5. GCS motor subscore is 6.   Skin: Skin is warm. Capillary refill takes less than 2 seconds. He is not diaphoretic.   Nursing note and vitals reviewed.       Assessment/Plan:     Tomas was seen today for seizures.    Diagnoses and all orders for this visit:    Seizure disorder (CMS/[facility])  -     levETIRAcetam (KEPPRA) 500 MG tablet; Take 1 tablet by mouth Daily.    Other orders  -     Insulin Lispro (ADMELOG SOLOSTAR) 100 UNIT/ML solution pen-injector; ADMINISTER 25 UNITS UNDER THE SKIN THREE TIMES DAILY WITH MEALS          Follow-up:     Return in about 6 months (around 12/11/2019) for Recheck Seizure.      Goals: keep doc appointments  Barriers to goals: pt compliance    Health Maintenance   Topic Date Due   • MENINGOCOCCAL VACCINE (Normal Risk) (1 - 2-dose series) 07/20/2014   • TDAP/TD VACCINES (1 - Tdap) 07/20/2017   • HPV VACCINES (2 - Male 3-dose series) 08/02/2018   • URINE MICROALBUMIN  10/20/2018   • HEMOGLOBIN A1C  01/05/2019   • PNEUMOCOCCAL VACCINE (19-64 MEDIUM RISK) (1 of 1 - PPSV23) 11/08/2019 (Originally 7/20/2017)   • DIABETIC FOOT EXAM  07/05/2019   • ANNUAL PHYSICAL  07/06/2019   • INFLUENZA VACCINE  08/01/2019   • DIABETIC EYE EXAM  10/24/2019        Tobacco: nonsmoker  Alcohol: does  not drink  Lifestyle: Body mass index is 21.42 kg/m². eat more fruits and vegetables, decrease soda or juice intake, increase water intake, increase physical activity, reduce screen time, reduce portion size, cut out extra servings, reduce fast food intake, family to eat at dinner table more often, keep TV off during meals, plan meals, eat breakfast and have 3 meals a day    RISK SCORE: 3        This document has been electronically signed by Eitan Hutchison MD on June 11, 2019 2:21 PM

## 2019-06-12 ENCOUNTER — OFFICE VISIT (OUTPATIENT)
Dept: ENDOCRINOLOGY | Facility: CLINIC | Age: 19
End: 2019-06-12

## 2019-06-12 ENCOUNTER — APPOINTMENT (OUTPATIENT)
Dept: LAB | Facility: HOSPITAL | Age: 19
End: 2019-06-12

## 2019-06-12 VITALS
HEART RATE: 103 BPM | SYSTOLIC BLOOD PRESSURE: 118 MMHG | BODY MASS INDEX: 21.21 KG/M2 | OXYGEN SATURATION: 97 % | DIASTOLIC BLOOD PRESSURE: 66 MMHG | HEIGHT: 66 IN | WEIGHT: 132 LBS

## 2019-06-12 DIAGNOSIS — E61.1 IRON DEFICIENCY: ICD-10-CM

## 2019-06-12 DIAGNOSIS — G40.909 SEIZURE DISORDER (HCC): ICD-10-CM

## 2019-06-12 DIAGNOSIS — E53.8 B12 DEFICIENCY: ICD-10-CM

## 2019-06-12 DIAGNOSIS — E55.9 VITAMIN D DEFICIENCY: ICD-10-CM

## 2019-06-12 DIAGNOSIS — E10.9 TYPE 1 DIABETES MELLITUS WITHOUT COMPLICATION (HCC): Primary | ICD-10-CM

## 2019-06-12 LAB
GLUCOSE BLDC GLUCOMTR-MCNC: 163 MG/DL (ref 70–130)
HBA1C MFR BLD: 6.5 %

## 2019-06-12 PROCEDURE — 84443 ASSAY THYROID STIM HORMONE: CPT | Performed by: INTERNAL MEDICINE

## 2019-06-12 PROCEDURE — 82962 GLUCOSE BLOOD TEST: CPT | Performed by: INTERNAL MEDICINE

## 2019-06-12 PROCEDURE — 80061 LIPID PANEL: CPT | Performed by: INTERNAL MEDICINE

## 2019-06-12 PROCEDURE — 85025 COMPLETE CBC W/AUTO DIFF WBC: CPT | Performed by: INTERNAL MEDICINE

## 2019-06-12 PROCEDURE — 80053 COMPREHEN METABOLIC PANEL: CPT | Performed by: INTERNAL MEDICINE

## 2019-06-12 PROCEDURE — 84466 ASSAY OF TRANSFERRIN: CPT | Performed by: INTERNAL MEDICINE

## 2019-06-12 PROCEDURE — 82306 VITAMIN D 25 HYDROXY: CPT | Performed by: INTERNAL MEDICINE

## 2019-06-12 PROCEDURE — 99214 OFFICE O/P EST MOD 30 MIN: CPT | Performed by: INTERNAL MEDICINE

## 2019-06-12 PROCEDURE — 83036 HEMOGLOBIN GLYCOSYLATED A1C: CPT | Performed by: INTERNAL MEDICINE

## 2019-06-12 PROCEDURE — 82607 VITAMIN B-12: CPT | Performed by: INTERNAL MEDICINE

## 2019-06-12 PROCEDURE — 83540 ASSAY OF IRON: CPT | Performed by: INTERNAL MEDICINE

## 2019-06-12 PROCEDURE — 36415 COLL VENOUS BLD VENIPUNCTURE: CPT | Performed by: INTERNAL MEDICINE

## 2019-06-12 RX ORDER — ERGOCALCIFEROL 1.25 MG/1
50000 CAPSULE ORAL
Qty: 4 CAPSULE | Refills: 11 | Status: SHIPPED | OUTPATIENT
Start: 2019-06-12 | End: 2020-04-06 | Stop reason: SDUPTHER

## 2019-06-12 NOTE — PROGRESS NOTES
I have seen the patient.  I have reviewed the notes, assessments, and/or procedures performed by Eitan Hutchison MD, I concur with her/his documentation and assessment and plan for Tomas Padron Scar[place].               This document has been electronically signed by Lavon Cartwright MD on June 12, 2019 4:51 PM

## 2019-06-12 NOTE — PROGRESS NOTES
" Tomas Novak is a 18 y.o. male who presents for  evaluation of   Chief Complaint   Patient presents with   • Diabetes         Primary Care / Referring Provider  Eitan Hutchison MD      Duration since 3 years old    Timing - Diabetes is Constant    Quality - good control    Severity -  moderate    Complications - none    Current symptoms/problems  none     Alleviating Factors: Compliance      Side Effects  none    Current diet  in general, a \"healthy\" diet      Current exercise exercise    Current monitoring regimen: home blood tests - 4 times daily  Home blood sugar records:     Hypoglycemia nocturnal, exertional , if skipped meals  and has had syncope     Past Medical History:   Diagnosis Date   • Seizure disorder (CMS/[facility]) 1/5/2017   • Type 1 diabetes mellitus without complication (CMS/[facility]) 1/5/2017   • Vitamin D deficiency 1/5/2017     Family History   Problem Relation Age of Onset   • Diabetes type II Father      Social History     Tobacco Use   • Smoking status: Never Smoker   • Smokeless tobacco: Never Used   Substance Use Topics   • Alcohol use: Defer   • Drug use: Defer         Current Outpatient Medications:   •  glucose blood (ACCU-CHEK PATT) test strip, 8 x daily, Disp: 240 each, Rfl: 11  •  Insulin Glargine (BASAGLAR KWIKPEN) 100 UNIT/ML injection pen, Inject 30 units under the skin daily as directed (Patient taking differently: 20 Units. Inject 30 units under the skin daily as directed), Disp: 3 pen, Rfl: 5  •  Insulin Lispro (ADMELOG SOLOSTAR) 100 UNIT/ML solution pen-injector, ADMINISTER 25 UNITS UNDER THE SKIN THREE TIMES DAILY WITH MEALS (Patient taking differently: 30 Units. ADMINISTER 25 UNITS UNDER THE SKIN THREE TIMES DAILY WITH MEALS), Disp: 5 pen, Rfl: 5  •  Insulin Pen Needle (B-D UF III MINI PEN NEEDLES) 31G X 5 MM misc, Use to inject insulin 4 times per day, Disp: 200 each, Rfl: 11  •  levETIRAcetam (KEPPRA) 500 MG tablet, Take 1 tablet by mouth Daily., Disp: 30 tablet, " Rfl: 5  •  vitamin D (ERGOCALCIFEROL) 12352 units capsule capsule, TAKE 1 CAPSULE BY MOUTH EVERY 7 DAYS, Disp: 4 capsule, Rfl: 0  •  insulin degludec (TRESIBA FLEXTOUCH) 100 UNIT/ML solution pen-injector injection, Inject 20 Units under the skin into the appropriate area as directed Every Night., Disp: 2 pen, Rfl: 11    Review of Systems    Review of Systems   Constitutional: Negative for activity change, appetite change, chills, diaphoresis, fatigue, fever and unexpected weight change.   HENT: Negative for congestion, drooling, ear discharge, ear pain, facial swelling, mouth sores, nosebleeds, postnasal drip, rhinorrhea, sinus pressure, sneezing, sore throat, tinnitus, trouble swallowing and voice change.    Eyes: Negative for photophobia, pain, discharge, redness, itching and visual disturbance.   Respiratory: Negative for apnea, cough, choking, chest tightness, shortness of breath, wheezing and stridor.    Cardiovascular: Negative for chest pain, palpitations and leg swelling.   Gastrointestinal: Negative for abdominal distention, abdominal pain, anal bleeding, blood in stool, constipation, diarrhea, nausea, rectal pain and vomiting.   Endocrine: Negative for cold intolerance, heat intolerance, polydipsia, polyphagia and polyuria.   Genitourinary: Negative for difficulty urinating, dysuria, enuresis, flank pain, frequency, hematuria and urgency.   Musculoskeletal: Negative for arthralgias, back pain, gait problem, joint swelling, myalgias, neck pain and neck stiffness.   Skin: Negative for color change, pallor and wound.   Allergic/Immunologic: Negative for environmental allergies, food allergies and immunocompromised state.   Neurological: Negative for dizziness, tremors, seizures, syncope, facial asymmetry, speech difficulty, weakness, light-headedness, numbness and headaches.   Hematological: Negative for adenopathy. Does not bruise/bleed easily.   Psychiatric/Behavioral: Negative for agitation, behavioral  "problems, confusion, decreased concentration, dysphoric mood, hallucinations, self-injury, sleep disturbance and suicidal ideas. The patient is not nervous/anxious and is not hyperactive.         Objective:     /66   Pulse 103   Ht 167.6 cm (66\")   Wt 59.9 kg (132 lb)   SpO2 97%   BMI 21.31 kg/m²     Physical Exam   Constitutional: He is oriented to person, place, and time. He appears well-developed and well-nourished. He is cooperative.   HENT:   Head: Normocephalic and atraumatic.   Right Ear: External ear normal.   Left Ear: External ear normal.   Nose: Nose normal.   Mouth/Throat: Oropharynx is clear and moist. No oropharyngeal exudate.   Eyes: Conjunctivae and EOM are normal. Pupils are equal, round, and reactive to light. No scleral icterus. Right eye exhibits normal extraocular motion. Left eye exhibits normal extraocular motion.   Neck: Neck supple. No JVD present. No muscular tenderness present. No tracheal deviation, no edema and no erythema present. No thyromegaly present.   Cardiovascular: Normal rate, regular rhythm, normal heart sounds and intact distal pulses. Exam reveals no gallop and no friction rub.   No murmur heard.  Pulmonary/Chest: Effort normal and breath sounds normal. No stridor. No respiratory distress. He has no decreased breath sounds. He has no wheezes. He has no rhonchi. He has no rales. He exhibits no tenderness.   Abdominal: Soft. Bowel sounds are normal. He exhibits no distension and no mass. There is no hepatomegaly. There is no tenderness. There is no rebound and no guarding. No hernia.   Musculoskeletal: Normal range of motion. He exhibits no edema, tenderness or deformity.   Lymphadenopathy:     He has no cervical adenopathy.   Neurological: He is alert and oriented to person, place, and time. He has normal reflexes. No cranial nerve deficit. He exhibits normal muscle tone. Coordination normal.   Skin: Skin is warm. No rash noted. No erythema. No pallor. "   Psychiatric: He has a normal mood and affect. His behavior is normal. Judgment and thought content normal.   Nursing note and vitals reviewed.      Lab Review    Results for orders placed or performed in visit on 06/12/19   POC Glucose   Result Value Ref Range    Glucose 163 (A) 70 - 130 mg/dL   POC Glycosylated Hemoglobin (Hb A1C)   Result Value Ref Range    Hemoglobin A1C 6.5 %           Assessment/Plan       ICD-10-CM ICD-9-CM   1. Type 1 diabetes mellitus without complication (CMS/[facility]) E10.9 250.01   2. Vitamin D deficiency E55.9 268.9   3. B12 deficiency E53.8 266.2       Glycemic Management:   Lab Results   Component Value Date    HGBA1C 6.5 06/12/2019    HGBA1C 8.0 (H) 10/20/2017     Lab Results   Component Value Date    GLUCOSE 220 (H) 10/20/2017    BUN 8 10/20/2017    CREATININE 0.64 (L) 10/20/2017    EGFRIFNONA 161 10/20/2017    BCR 12.5 10/20/2017    CO2 29.0 10/20/2017    CALCIUM 9.4 10/20/2017    ALBUMIN 4.40 10/20/2017    AST 29 10/20/2017    ALT 21 10/20/2017     Lab Results   Component Value Date    WBC 4.37 10/20/2017    HGB 15.5 10/20/2017    HCT 46.3 10/20/2017    MCV 83.4 10/20/2017     10/20/2017       1 unit per 5 grams    8 units per 100 or 1 to 12 either way     --      Basaglar 20 units , they wouldn't pay for toujeo or tresiba     Try again, he has many hypoglycemic events without a pattern and this is in part due to the variability of basaglar.   We know that tresiba causes 40% less hypoglycemia     Not interested in dexcom or pump    I hope he agrees to cgms in the future    He is testing 8 x daily       Lipid Management  Lab Results   Component Value Date    CHOL 135 10/20/2017     Lab Results   Component Value Date    TRIG 78 10/20/2017     Lab Results   Component Value Date    HDL 38 (L) 10/20/2017     No components found for: LDLCALC  Lab Results   Component Value Date    LDL 86 10/20/2017           Blood Pressure Management:    Vitals:    06/12/19 1502   BP: 118/66   Pulse:  103   SpO2: 97%     Lab Results   Component Value Date    GLUCOSE 220 (H) 10/20/2017    CALCIUM 9.4 10/20/2017     10/20/2017    K 4.3 10/20/2017    CO2 29.0 10/20/2017    CL 98 10/20/2017    BUN 8 10/20/2017    CREATININE 0.64 (L) 10/20/2017    EGFRIFNONA 161 10/20/2017    BCR 12.5 10/20/2017    ANIONGAP 13.0 10/20/2017     Lab Results   Component Value Date    GLUCOSE 220 (H) 10/20/2017    BUN 8 10/20/2017    CREATININE 0.64 (L) 10/20/2017    EGFRIFNONA 161 10/20/2017    BCR 12.5 10/20/2017    CO2 29.0 10/20/2017    CALCIUM 9.4 10/20/2017    ALBUMIN 4.40 10/20/2017    AST 29 10/20/2017    ALT 21 10/20/2017             Microvascular Complication Monitoring:      No complications           Immunizations:      Up to date     Preventive Care:      Nonsmoking     Weight Related:   Wt Readings from Last 3 Encounters:   06/12/19 59.9 kg (132 lb)   06/11/19 60.2 kg (132 lb 11.2 oz)   03/08/19 61.7 kg (136 lb)     Body mass index is 21.31 kg/m².        Bone Health    Lab Results   Component Value Date    CALCIUM 9.4 10/20/2017       Assess vit D since on seizure meds.     Start vit D 50 th units weekly       Thyroid Health  Lab Results   Component Value Date    TSH 1.230 10/20/2017           Other Diabetes Related Aspects       Lab Results   Component Value Date    [id number] 414 10/20/2017           Seizure disorder    Refer to neurology  I will refill his keppra 500 mg daily since I don't want him to run out but he needs neurologist.       I reviewed and summarized records from Eitan Hutchison MD from present year  and I reviewed / ordered labs.     Orders Placed This Encounter   Procedures   • CBC Auto Differential   • Comprehensive Metabolic Panel   • Hemoglobin A1c   • Lipid Panel   • Microalbumin / Creatinine Urine Ratio - Urine, Clean Catch   • TSH   • Vitamin B12   • Vitamin D 25 Hydroxy   • Celiac Panel Reflex To Titer   • POC Glucose   • POC Glycosylated Hemoglobin (Hb A1C)         A copy of my note was  sent to Eitan Hutchison MD    Please see my above opinion and suggestions.

## 2019-06-13 LAB
25(OH)D3 SERPL-MCNC: 21.7 NG/ML (ref 30–100)
ALBUMIN SERPL-MCNC: 5 G/DL (ref 3.5–5.2)
ALBUMIN/GLOB SERPL: 1.5 G/DL
ALP SERPL-CCNC: 139 U/L (ref 39–117)
ALT SERPL W P-5'-P-CCNC: 14 U/L (ref 1–41)
ANION GAP SERPL CALCULATED.3IONS-SCNC: 14.2 MMOL/L
AST SERPL-CCNC: 18 U/L (ref 1–40)
BASOPHILS # BLD AUTO: 0.05 10*3/MM3 (ref 0–0.2)
BASOPHILS NFR BLD AUTO: 0.8 % (ref 0–1.5)
BILIRUB SERPL-MCNC: 0.8 MG/DL (ref 0.2–1.2)
BUN BLD-MCNC: 13 MG/DL (ref 6–20)
BUN/CREAT SERPL: 14.3 (ref 7–25)
CALCIUM SPEC-SCNC: 10.5 MG/DL (ref 8.6–10.5)
CHLORIDE SERPL-SCNC: 99 MMOL/L (ref 98–107)
CHOLEST SERPL-MCNC: 147 MG/DL (ref 0–200)
CO2 SERPL-SCNC: 28.8 MMOL/L (ref 22–29)
CREAT BLD-MCNC: 0.91 MG/DL (ref 0.76–1.27)
DEPRECATED RDW RBC AUTO: 37.2 FL (ref 37–54)
EOSINOPHIL # BLD AUTO: 0.13 10*3/MM3 (ref 0–0.4)
EOSINOPHIL NFR BLD AUTO: 2.2 % (ref 0.3–6.2)
ERYTHROCYTE [DISTWIDTH] IN BLOOD BY AUTOMATED COUNT: 12.3 % (ref 12.3–15.4)
GFR SERPL CREATININE-BSD FRML MDRD: 106 ML/MIN/1.73
GLOBULIN UR ELPH-MCNC: 3.4 GM/DL
GLUCOSE BLD-MCNC: 115 MG/DL (ref 65–99)
HBA1C MFR BLD: 6.5 % (ref 4.8–5.6)
HCT VFR BLD AUTO: 53.7 % (ref 37.5–51)
HDLC SERPL-MCNC: 42 MG/DL (ref 40–60)
HGB BLD-MCNC: 18.1 G/DL (ref 13–17.7)
IMM GRANULOCYTES # BLD AUTO: 0.01 10*3/MM3 (ref 0–0.05)
IMM GRANULOCYTES NFR BLD AUTO: 0.2 % (ref 0–0.5)
IRON 24H UR-MRATE: 172 MCG/DL (ref 59–158)
IRON SATN MFR SERPL: 42 % (ref 20–50)
LDLC SERPL CALC-MCNC: 87 MG/DL (ref 0–100)
LDLC/HDLC SERPL: 2.06 {RATIO}
LYMPHOCYTES # BLD AUTO: 2.06 10*3/MM3 (ref 0.7–3.1)
LYMPHOCYTES NFR BLD AUTO: 34.9 % (ref 19.6–45.3)
MCH RBC QN AUTO: 28.1 PG (ref 26.6–33)
MCHC RBC AUTO-ENTMCNC: 33.7 G/DL (ref 31.5–35.7)
MCV RBC AUTO: 83.3 FL (ref 79–97)
MONOCYTES # BLD AUTO: 0.46 10*3/MM3 (ref 0.1–0.9)
MONOCYTES NFR BLD AUTO: 7.8 % (ref 5–12)
NEUTROPHILS # BLD AUTO: 3.2 10*3/MM3 (ref 1.7–7)
NEUTROPHILS NFR BLD AUTO: 54.1 % (ref 42.7–76)
NRBC BLD AUTO-RTO: 0 /100 WBC (ref 0–0.2)
PLATELET # BLD AUTO: 261 10*3/MM3 (ref 140–450)
PMV BLD AUTO: 11.4 FL (ref 6–12)
POTASSIUM BLD-SCNC: 4.7 MMOL/L (ref 3.5–5.2)
PROT SERPL-MCNC: 8.4 G/DL (ref 6–8.5)
RBC # BLD AUTO: 6.45 10*6/MM3 (ref 4.14–5.8)
SODIUM BLD-SCNC: 142 MMOL/L (ref 136–145)
TIBC SERPL-MCNC: 408 MCG/DL (ref 298–536)
TRANSFERRIN SERPL-MCNC: 274 MG/DL (ref 200–360)
TRIGL SERPL-MCNC: 92 MG/DL (ref 0–150)
TSH SERPL DL<=0.05 MIU/L-ACNC: 1.09 MIU/ML (ref 0.27–4.2)
VIT B12 BLD-MCNC: 685 PG/ML (ref 211–946)
VLDLC SERPL-MCNC: 18.4 MG/DL (ref 5–40)
WBC NRBC COR # BLD: 5.91 10*3/MM3 (ref 3.4–10.8)

## 2019-06-18 DIAGNOSIS — D75.1 POLYCYTHEMIA: Primary | ICD-10-CM

## 2019-06-18 DIAGNOSIS — E83.19 IRON EXCESS: ICD-10-CM

## 2019-08-14 ENCOUNTER — TELEPHONE (OUTPATIENT)
Dept: FAMILY MEDICINE CLINIC | Facility: CLINIC | Age: 19
End: 2019-08-14

## 2019-08-14 DIAGNOSIS — E10.9 TYPE 1 DIABETES MELLITUS WITHOUT COMPLICATION (HCC): ICD-10-CM

## 2019-08-14 NOTE — TELEPHONE ENCOUNTER
Pt called and was checking on the status of a PA that Zulay had sent over on glucose blood (ACCU-CHEK PATT) test strip. He would like a call back when this has been done. Zulay did send it and its in the chart. He is a Dr. Hutchison pt.      Thank you,      Ana Laura

## 2019-10-02 ENCOUNTER — APPOINTMENT (OUTPATIENT)
Dept: LAB | Facility: HOSPITAL | Age: 19
End: 2019-10-02

## 2019-10-02 ENCOUNTER — OFFICE VISIT (OUTPATIENT)
Dept: ENDOCRINOLOGY | Facility: CLINIC | Age: 19
End: 2019-10-02

## 2019-10-02 VITALS
OXYGEN SATURATION: 98 % | BODY MASS INDEX: 21.47 KG/M2 | HEART RATE: 78 BPM | WEIGHT: 133.6 LBS | DIASTOLIC BLOOD PRESSURE: 62 MMHG | HEIGHT: 66 IN | SYSTOLIC BLOOD PRESSURE: 104 MMHG

## 2019-10-02 DIAGNOSIS — F32.1 MODERATE MAJOR DEPRESSION (HCC): ICD-10-CM

## 2019-10-02 DIAGNOSIS — G40.909 SEIZURE DISORDER (HCC): ICD-10-CM

## 2019-10-02 DIAGNOSIS — E10.9 TYPE 1 DIABETES MELLITUS WITHOUT COMPLICATION (HCC): Primary | ICD-10-CM

## 2019-10-02 DIAGNOSIS — E55.9 VITAMIN D DEFICIENCY: ICD-10-CM

## 2019-10-02 LAB
25(OH)D3 SERPL-MCNC: 64.4 NG/ML (ref 30–100)
ALBUMIN SERPL-MCNC: 5 G/DL (ref 3.5–5.2)
ALBUMIN UR-MCNC: <1.2 MG/DL
ALBUMIN/GLOB SERPL: 1.6 G/DL
ALP SERPL-CCNC: 157 U/L (ref 39–117)
ALT SERPL W P-5'-P-CCNC: 10 U/L (ref 1–41)
ANION GAP SERPL CALCULATED.3IONS-SCNC: 14.9 MMOL/L (ref 5–15)
AST SERPL-CCNC: 17 U/L (ref 1–40)
BASOPHILS # BLD AUTO: 0.06 10*3/MM3 (ref 0–0.2)
BASOPHILS NFR BLD AUTO: 0.8 % (ref 0–1.5)
BILIRUB SERPL-MCNC: 0.4 MG/DL (ref 0.2–1.2)
BUN BLD-MCNC: 13 MG/DL (ref 6–20)
BUN/CREAT SERPL: 17.3 (ref 7–25)
CALCIUM SPEC-SCNC: 10 MG/DL (ref 8.6–10.5)
CHLORIDE SERPL-SCNC: 96 MMOL/L (ref 98–107)
CHOLEST SERPL-MCNC: 128 MG/DL (ref 0–200)
CO2 SERPL-SCNC: 28.1 MMOL/L (ref 22–29)
CREAT BLD-MCNC: 0.75 MG/DL (ref 0.76–1.27)
CREAT UR-MCNC: 135.8 MG/DL
DEPRECATED RDW RBC AUTO: 38.5 FL (ref 37–54)
EOSINOPHIL # BLD AUTO: 0.26 10*3/MM3 (ref 0–0.4)
EOSINOPHIL NFR BLD AUTO: 3.5 % (ref 0.3–6.2)
ERYTHROCYTE [DISTWIDTH] IN BLOOD BY AUTOMATED COUNT: 12.8 % (ref 12.3–15.4)
GFR SERPL CREATININE-BSD FRML MDRD: 131 ML/MIN/1.73
GLOBULIN UR ELPH-MCNC: 3.2 GM/DL
GLUCOSE BLD-MCNC: 91 MG/DL (ref 65–99)
GLUCOSE BLDC GLUCOMTR-MCNC: 130 MG/DL (ref 70–130)
HBA1C MFR BLD: 6.6 %
HCT VFR BLD AUTO: 48.9 % (ref 37.5–51)
HDLC SERPL-MCNC: 44 MG/DL (ref 40–60)
HGB BLD-MCNC: 16.5 G/DL (ref 13–17.7)
IMM GRANULOCYTES # BLD AUTO: 0.02 10*3/MM3 (ref 0–0.05)
IMM GRANULOCYTES NFR BLD AUTO: 0.3 % (ref 0–0.5)
LDLC SERPL CALC-MCNC: 75 MG/DL (ref 0–100)
LDLC/HDLC SERPL: 1.71 {RATIO}
LYMPHOCYTES # BLD AUTO: 2.74 10*3/MM3 (ref 0.7–3.1)
LYMPHOCYTES NFR BLD AUTO: 36.9 % (ref 19.6–45.3)
MCH RBC QN AUTO: 28.4 PG (ref 26.6–33)
MCHC RBC AUTO-ENTMCNC: 33.7 G/DL (ref 31.5–35.7)
MCV RBC AUTO: 84 FL (ref 79–97)
MICROALBUMIN/CREAT UR: NORMAL MG/G{CREAT}
MONOCYTES # BLD AUTO: 0.64 10*3/MM3 (ref 0.1–0.9)
MONOCYTES NFR BLD AUTO: 8.6 % (ref 5–12)
NEUTROPHILS # BLD AUTO: 3.71 10*3/MM3 (ref 1.7–7)
NEUTROPHILS NFR BLD AUTO: 49.9 % (ref 42.7–76)
NRBC BLD AUTO-RTO: 0.1 /100 WBC (ref 0–0.2)
PLATELET # BLD AUTO: 296 10*3/MM3 (ref 140–450)
PMV BLD AUTO: 10.7 FL (ref 6–12)
POTASSIUM BLD-SCNC: 4.1 MMOL/L (ref 3.5–5.2)
PROT SERPL-MCNC: 8.2 G/DL (ref 6–8.5)
RBC # BLD AUTO: 5.82 10*6/MM3 (ref 4.14–5.8)
SODIUM BLD-SCNC: 139 MMOL/L (ref 136–145)
TRIGL SERPL-MCNC: 44 MG/DL (ref 0–150)
TSH SERPL DL<=0.05 MIU/L-ACNC: 2.52 UIU/ML (ref 0.27–4.2)
VIT B12 BLD-MCNC: 615 PG/ML (ref 211–946)
VLDLC SERPL-MCNC: 8.8 MG/DL (ref 5–40)
WBC NRBC COR # BLD: 7.43 10*3/MM3 (ref 3.4–10.8)

## 2019-10-02 PROCEDURE — 84443 ASSAY THYROID STIM HORMONE: CPT | Performed by: INTERNAL MEDICINE

## 2019-10-02 PROCEDURE — 99214 OFFICE O/P EST MOD 30 MIN: CPT | Performed by: INTERNAL MEDICINE

## 2019-10-02 PROCEDURE — 82784 ASSAY IGA/IGD/IGG/IGM EACH: CPT | Performed by: INTERNAL MEDICINE

## 2019-10-02 PROCEDURE — 83516 IMMUNOASSAY NONANTIBODY: CPT | Performed by: INTERNAL MEDICINE

## 2019-10-02 PROCEDURE — 83036 HEMOGLOBIN GLYCOSYLATED A1C: CPT | Performed by: INTERNAL MEDICINE

## 2019-10-02 PROCEDURE — 80053 COMPREHEN METABOLIC PANEL: CPT | Performed by: INTERNAL MEDICINE

## 2019-10-02 PROCEDURE — 85025 COMPLETE CBC W/AUTO DIFF WBC: CPT | Performed by: INTERNAL MEDICINE

## 2019-10-02 PROCEDURE — 82306 VITAMIN D 25 HYDROXY: CPT | Performed by: INTERNAL MEDICINE

## 2019-10-02 PROCEDURE — 82043 UR ALBUMIN QUANTITATIVE: CPT | Performed by: INTERNAL MEDICINE

## 2019-10-02 PROCEDURE — 82570 ASSAY OF URINE CREATININE: CPT | Performed by: INTERNAL MEDICINE

## 2019-10-02 PROCEDURE — 82607 VITAMIN B-12: CPT | Performed by: INTERNAL MEDICINE

## 2019-10-02 PROCEDURE — 80061 LIPID PANEL: CPT | Performed by: INTERNAL MEDICINE

## 2019-10-02 PROCEDURE — 36415 COLL VENOUS BLD VENIPUNCTURE: CPT | Performed by: INTERNAL MEDICINE

## 2019-10-02 PROCEDURE — 82962 GLUCOSE BLOOD TEST: CPT | Performed by: INTERNAL MEDICINE

## 2019-10-02 RX ORDER — LEVETIRACETAM 500 MG/1
500 TABLET ORAL DAILY
Qty: 30 TABLET | Refills: 11 | Status: SHIPPED | OUTPATIENT
Start: 2019-10-02 | End: 2020-05-01 | Stop reason: SDUPTHER

## 2019-10-02 NOTE — PROGRESS NOTES
" Tomas Novak is a 19 y.o. male who presents for  evaluation of   Chief Complaint   Patient presents with   • Diabetes         Primary Care / Referring Provider  Eitan Hutchison MD      Duration since 3 years old    Timing - Diabetes is Constant    Quality - good control    Severity -  moderate    Complications - none    Current symptoms/problems  none     Alleviating Factors: Compliance      Side Effects  none    Current diet  in general, a \"healthy\" diet      Current exercise exercise    Current monitoring regimen: home blood tests - 4 times daily  Home blood sugar records:     Hypoglycemia nocturnal, exertional , if skipped meals  and has had syncope     Past Medical History:   Diagnosis Date   • Seizure disorder (CMS/[facility]) 1/5/2017   • Type 1 diabetes mellitus without complication (CMS/[facility]) 1/5/2017   • Vitamin D deficiency 1/5/2017     Family History   Problem Relation Age of Onset   • Diabetes type II Father      Social History     Tobacco Use   • Smoking status: Never Smoker   • Smokeless tobacco: Never Used   Substance Use Topics   • Alcohol use: Defer   • Drug use: Defer         Current Outpatient Medications:   •  Cholecalciferol 10387 units tablet, 50 thousand units po q 2 weeks, Disp: 2 tablet, Rfl: 11  •  glucose blood (ACCU-CHEK PATT) test strip, Used to check blood sugar up to 7 times daily, Disp: 240 each, Rfl: 11  •  Insulin Lispro (ADMELOG SOLOSTAR) 100 UNIT/ML solution pen-injector, 15 units subcutaneously qac tid, Disp: 5 pen, Rfl: 11  •  Insulin Pen Needle (B-D UF III MINI PEN NEEDLES) 31G X 5 MM misc, Use to inject insulin 4 times per day, Disp: 200 each, Rfl: 11  •  levETIRAcetam (KEPPRA) 500 MG tablet, Take 1 tablet by mouth Daily., Disp: 30 tablet, Rfl: 11  •  vitamin D (ERGOCALCIFEROL) 06632 units capsule capsule, Take 1 capsule by mouth Every 7 (Seven) Days., Disp: 4 capsule, Rfl: 11  •  Insulin Glargine (TOUJEO SOLOSTAR) 300 UNIT/ML solution pen-injector injection, " Inject 30 Units under the skin into the appropriate area as directed every night at bedtime., Disp: 2 pen, Rfl: 11    Review of Systems    Review of Systems   Constitutional: Negative for activity change, appetite change, chills, diaphoresis, fatigue, fever and unexpected weight change.   HENT: Negative for congestion, drooling, ear discharge, ear pain, facial swelling, mouth sores, nosebleeds, postnasal drip, rhinorrhea, sinus pressure, sneezing, sore throat, tinnitus, trouble swallowing and voice change.    Eyes: Negative for photophobia, pain, discharge, redness, itching and visual disturbance.   Respiratory: Negative for apnea, cough, choking, chest tightness, shortness of breath, wheezing and stridor.    Cardiovascular: Negative for chest pain, palpitations and leg swelling.   Gastrointestinal: Negative for abdominal distention, abdominal pain, anal bleeding, blood in stool, constipation, diarrhea, nausea, rectal pain and vomiting.   Endocrine: Negative for cold intolerance, heat intolerance, polydipsia, polyphagia and polyuria.   Genitourinary: Negative for difficulty urinating, dysuria, enuresis, flank pain, frequency, hematuria and urgency.   Musculoskeletal: Negative for arthralgias, back pain, gait problem, joint swelling, myalgias, neck pain and neck stiffness.   Skin: Negative for color change, pallor and wound.   Allergic/Immunologic: Negative for environmental allergies, food allergies and immunocompromised state.   Neurological: Negative for dizziness, tremors, seizures, syncope, facial asymmetry, speech difficulty, weakness, light-headedness, numbness and headaches.   Hematological: Negative for adenopathy. Does not bruise/bleed easily.   Psychiatric/Behavioral: Negative for agitation, behavioral problems, confusion, decreased concentration, dysphoric mood, hallucinations, self-injury, sleep disturbance and suicidal ideas. The patient is not nervous/anxious and is not hyperactive.         Objective:  "    /62   Pulse 78   Ht 167.6 cm (66\")   Wt 60.6 kg (133 lb 9.6 oz)   SpO2 98%   BMI 21.56 kg/m²     Physical Exam   Constitutional: He is oriented to person, place, and time. He appears well-developed and well-nourished. He is cooperative.   HENT:   Head: Normocephalic and atraumatic.   Right Ear: External ear normal.   Left Ear: External ear normal.   Nose: Nose normal.   Mouth/Throat: Oropharynx is clear and moist. No oropharyngeal exudate.   Eyes: Conjunctivae and EOM are normal. Pupils are equal, round, and reactive to light. No scleral icterus. Right eye exhibits normal extraocular motion. Left eye exhibits normal extraocular motion.   Neck: Neck supple. No JVD present. No muscular tenderness present. No tracheal deviation, no edema and no erythema present. No thyromegaly present.   Cardiovascular: Normal rate, regular rhythm, normal heart sounds and intact distal pulses. Exam reveals no gallop and no friction rub.   No murmur heard.  Pulmonary/Chest: Effort normal and breath sounds normal. No stridor. No respiratory distress. He has no decreased breath sounds. He has no wheezes. He has no rhonchi. He has no rales. He exhibits no tenderness.   Abdominal: Soft. Bowel sounds are normal. He exhibits no distension and no mass. There is no hepatomegaly. There is no tenderness. There is no rebound and no guarding. No hernia.   Musculoskeletal: Normal range of motion. He exhibits no edema, tenderness or deformity.   Lymphadenopathy:     He has no cervical adenopathy.   Neurological: He is alert and oriented to person, place, and time. He has normal reflexes. No cranial nerve deficit. He exhibits normal muscle tone. Coordination normal.   Skin: Skin is warm. No rash noted. No erythema. No pallor.   Psychiatric: He has a normal mood and affect. His behavior is normal. Judgment and thought content normal.   Nursing note and vitals reviewed.      Lab Review    Results for orders placed or performed in visit on " 10/02/19   POC Glycosylated Hemoglobin (Hb A1C)   Result Value Ref Range    Hemoglobin A1C 6.6 %   POC Glucose Fingerstick   Result Value Ref Range    Glucose 130 70 - 130 mg/dL           Assessment/Plan       ICD-10-CM ICD-9-CM   1. Type 1 diabetes mellitus without complication (CMS/[facility]) E10.9 250.01   2. Seizure disorder (CMS/[facility]) G40.909 345.90   3. Vitamin D deficiency E55.9 268.9   4. Moderate major depression (CMS/[facility]) F32.1 296.22       Glycemic Management:   Lab Results   Component Value Date    HGBA1C 6.6 10/02/2019    HGBA1C 6.50 (H) 06/12/2019    HGBA1C 6.5 06/12/2019     Lab Results   Component Value Date    GLUCOSE 115 (H) 06/12/2019    BUN 13 06/12/2019    CREATININE 0.91 06/12/2019    EGFRIFNONA 106 06/12/2019    BCR 14.3 06/12/2019    CO2 28.8 06/12/2019    CALCIUM 10.5 06/12/2019    ALBUMIN 5.00 06/12/2019    AST 18 06/12/2019    ALT 14 06/12/2019     Lab Results   Component Value Date    WBC 5.91 06/12/2019    HGB 18.1 (H) 06/12/2019    HCT 53.7 (H) 06/12/2019    MCV 83.3 06/12/2019     06/12/2019       1 unit per 5 grams     1 to 12 correction     --      Basaglar 20 units , they are now willing to pay for toujeo    Try again, he has many hypoglycemic events without a pattern and this is in part due to the variability of basaglar.   We know that tresiba causes 40% less hypoglycemia     Not interested in dexcom or pump    I hope he agrees to cgms in the future    He is testing 8 x daily       Lipid Management  Lab Results   Component Value Date    CHOL 147 06/12/2019    CHOL 135 10/20/2017     Lab Results   Component Value Date    TRIG 92 06/12/2019    TRIG 78 10/20/2017     Lab Results   Component Value Date    HDL 42 06/12/2019    HDL 38 (L) 10/20/2017     No components found for: LDLCALC  Lab Results   Component Value Date    LDL 87 06/12/2019    LDL 86 10/20/2017           Blood Pressure Management:    Vitals:    10/02/19 1129   BP: 104/62   Pulse: 78   SpO2: 98%     Lab Results    Component Value Date    GLUCOSE 115 (H) 06/12/2019    CALCIUM 10.5 06/12/2019     06/12/2019    K 4.7 06/12/2019    CO2 28.8 06/12/2019    CL 99 06/12/2019    BUN 13 06/12/2019    CREATININE 0.91 06/12/2019    EGFRIFNONA 106 06/12/2019    BCR 14.3 06/12/2019    ANIONGAP 14.2 06/12/2019     Lab Results   Component Value Date    GLUCOSE 115 (H) 06/12/2019    BUN 13 06/12/2019    CREATININE 0.91 06/12/2019    EGFRIFNONA 106 06/12/2019    BCR 14.3 06/12/2019    CO2 28.8 06/12/2019    CALCIUM 10.5 06/12/2019    ALBUMIN 5.00 06/12/2019    AST 18 06/12/2019    ALT 14 06/12/2019             Microvascular Complication Monitoring:      No complications           Immunizations:      Up to date     Preventive Care:      Nonsmoking     Weight Related:   Wt Readings from Last 3 Encounters:   10/02/19 60.6 kg (133 lb 9.6 oz)   06/12/19 59.9 kg (132 lb)   06/11/19 60.2 kg (132 lb 11.2 oz)     Body mass index is 21.56 kg/m².        Bone Health    Lab Results   Component Value Date    CALCIUM 10.5 06/12/2019       Assess vit D since on seizure meds.     Start vit D 50 th units weekly       Thyroid Health  Lab Results   Component Value Date    TSH 1.090 06/12/2019           Other Diabetes Related Aspects       Lab Results   Component Value Date    [id number] 06/12/2019           Seizure disorder    Refer to neurology  I will refill his keppra 500 mg daily since I don't want him to run out but he needs neurologist.     Depression    Refer to psychology / psychiatry       I reviewed and summarized records from Eitan Hutchison MD from present year  and I reviewed / ordered labs.     Orders Placed This Encounter   Procedures   • CBC Auto Differential   • Lipid Panel   • Microalbumin / Creatinine Urine Ratio - Urine, Clean Catch   • TSH   • Vitamin B12   • Vitamin D 25 Hydroxy   • Celiac Panel Reflex To Titer   • Comprehensive Metabolic Panel   • Ambulatory Referral to Psychiatry     Referral Priority:   Routine     Referral  Type:   Behavorial Health/Psych     Referral Reason:   Specialty Services Required     Requested Specialty:   Psychiatry     Number of Visits Requested:   1   • POC Glycosylated Hemoglobin (Hb A1C)   • POC Glucose Fingerstick         A copy of my note was sent to Eitan Hutchison MD    Please see my above opinion and suggestions.

## 2019-10-03 LAB
GLIADIN PEPTIDE IGA SER-ACNC: 3 UNITS (ref 0–19)
IGA SERPL-MCNC: 168 MG/DL (ref 90–386)
TTG IGA SER-ACNC: <2 U/ML (ref 0–3)

## 2019-12-23 ENCOUNTER — TELEPHONE (OUTPATIENT)
Dept: ENDOCRINOLOGY | Facility: CLINIC | Age: 19
End: 2019-12-23

## 2019-12-23 DIAGNOSIS — E10.9 TYPE 1 DIABETES MELLITUS WITHOUT COMPLICATION (HCC): Primary | ICD-10-CM

## 2019-12-26 ENCOUNTER — TELEPHONE (OUTPATIENT)
Dept: FAMILY MEDICINE CLINIC | Facility: CLINIC | Age: 19
End: 2019-12-26

## 2020-01-02 ENCOUNTER — OFFICE VISIT (OUTPATIENT)
Dept: FAMILY MEDICINE CLINIC | Facility: CLINIC | Age: 20
End: 2020-01-02

## 2020-01-02 VITALS
BODY MASS INDEX: 20.93 KG/M2 | SYSTOLIC BLOOD PRESSURE: 110 MMHG | DIASTOLIC BLOOD PRESSURE: 80 MMHG | HEIGHT: 66 IN | OXYGEN SATURATION: 98 % | HEART RATE: 124 BPM | WEIGHT: 130.2 LBS

## 2020-01-02 DIAGNOSIS — L70.9 ACNE, UNSPECIFIED ACNE TYPE: ICD-10-CM

## 2020-01-02 DIAGNOSIS — G40.909 SEIZURE DISORDER (HCC): Primary | ICD-10-CM

## 2020-01-02 DIAGNOSIS — E10.9 TYPE 1 DIABETES MELLITUS WITHOUT COMPLICATION (HCC): ICD-10-CM

## 2020-01-02 PROCEDURE — 99213 OFFICE O/P EST LOW 20 MIN: CPT | Performed by: STUDENT IN AN ORGANIZED HEALTH CARE EDUCATION/TRAINING PROGRAM

## 2020-01-02 NOTE — PROGRESS NOTES
ID: Tomas Novak    CC:   Chief Complaint   Patient presents with   • Seizures       Subjective:     Tomas Novak is a 17 y.o. male who presents for:    Patient presents for his seizure follow-up.  He continues to do well on his medications without having a seizure for some time.  Patient like to have referral to neurology because he has not seen one in many years.    Patient also would like a referral to dermatology for his acne.  He reports he has gotten worse over the last several weeks despite keeping his face clean with multiple over-the-counter products.    Endocrinology is managing his type 1 diabetes.      Past Medical Hx:  Past Medical History:   Diagnosis Date   • Seizure disorder (CMS/HCC) 1/5/2017   • Type 1 diabetes mellitus without complication (CMS/[facility]) 1/5/2017   • Vitamin D deficiency 1/5/2017       Past Surgical Hx:  Past Surgical History:   Procedure Laterality Date   • NO PAST SURGERIES         Health Maintenance:  Health Maintenance   Topic Date Due   • TDAP/TD VACCINES (1 - Tdap) 07/20/2009   • PNEUMOCOCCAL VACCINE (19-64 MEDIUM RISK) (1 of 1 - PPSV23) 07/20/2017   • HPV VACCINES (2 - Male 3-dose series) 08/02/2018   • DIABETIC FOOT EXAM  07/05/2019   • ANNUAL PHYSICAL  07/06/2019   • DIABETIC EYE EXAM  10/24/2019   • HEMOGLOBIN A1C  04/02/2020   • URINE MICROALBUMIN  10/02/2020   • INFLUENZA VACCINE  Addressed   • MENINGOCOCCAL VACCINE (Normal Risk)  Aged Out       Current Meds:    Current Outpatient Medications:   •  Cholecalciferol 97826 units tablet, 50 thousand units po q 2 weeks, Disp: 2 tablet, Rfl: 11  •  glucose blood (ACCU-CHEK PATT) test strip, Used to check blood sugar up to 7 times daily, Disp: 240 each, Rfl: 11  •  Insulin Degludec (TRESIBA FLEXTOUCH) 200 UNIT/ML solution pen-injector pen injection, Inject 30 units under the skin every night at bedtime, Disp: 2 pen, Rfl: 11  •  Insulin Lispro (ADMELOG SOLOSTAR) 100 UNIT/ML solution pen-injector, 15 units  "subcutaneously qac tid, Disp: 5 pen, Rfl: 11  •  Insulin Pen Needle (B-D UF III MINI PEN NEEDLES) 31G X 5 MM misc, Use to inject insulin 4 times per day, Disp: 200 each, Rfl: 11  •  levETIRAcetam (KEPPRA) 500 MG tablet, Take 1 tablet by mouth Daily., Disp: 30 tablet, Rfl: 11  •  vitamin D (ERGOCALCIFEROL) 54200 units capsule capsule, Take 1 capsule by mouth Every 7 (Seven) Days., Disp: 4 capsule, Rfl: 11    Allergies:  Patient has no known allergies.    Family Hx:  Family History   Problem Relation Age of Onset   • Diabetes type II Father         Social History:  Social History     Socioeconomic History   • Marital status: Unknown     Spouse name: Not on file   • Number of children: Not on file   • Years of education: Not on file   • Highest education level: Not on file   Tobacco Use   • Smoking status: Never Smoker   • Smokeless tobacco: Never Used   Substance and Sexual Activity   • Alcohol use: Defer   • Drug use: Defer   • Sexual activity: Defer       Review of Systems   Constitutional: Negative for chills, diaphoresis, fatigue and fever.   HENT: Negative for congestion and rhinorrhea.    Eyes: Negative for discharge and redness.   Respiratory: Negative for chest tightness, shortness of breath and wheezing.    Cardiovascular: Negative for palpitations and leg swelling.   Gastrointestinal: Negative for abdominal pain and constipation.   Genitourinary: Negative for dysuria and flank pain.   Skin: Positive for rash. Negative for color change.   Neurological: Negative for dizziness, seizures and syncope.   Psychiatric/Behavioral: Negative for agitation. The patient is not nervous/anxious.        Objective:     /80   Pulse (!) 124   Ht 167.6 cm (66\")   Wt 59.1 kg (130 lb 3.2 oz)   SpO2 98%   BMI 21.01 kg/m²     Physical Exam   Constitutional: He is oriented to person, place, and time. He appears well-developed and well-nourished. No distress.   HENT:   Head: Normocephalic and atraumatic.   Eyes: " Conjunctivae are normal.   Neck: Normal range of motion. Neck supple.   Cardiovascular: Normal rate.   Pulmonary/Chest: Effort normal. No respiratory distress.   Abdominal: Soft. There is no tenderness.   Neurological: He is alert and oriented to person, place, and time. No cranial nerve deficit.   Skin: Skin is warm and dry. Capillary refill takes less than 2 seconds. Rash noted. He is not diaphoretic.   Psychiatric: He has a normal mood and affect. His behavior is normal.   Nursing note and vitals reviewed.       Assessment/Plan:     Tomas was seen today for:     Diagnosis Plan   1. Seizure disorder (CMS/[facility])  Ambulatory Referral to Neurology   2. Type 1 diabetes mellitus without complication (CMS/[facility])     3. Acne, unspecified acne type  Ambulatory Referral to Dermatology     Referral placed for neurology for the patient's seizures.  Referral placed for the patient's facial acne as well.  Endocrinology continues to manage the patient's type 1 diabetes.    Follow-up:     Return in about 6 months (around 7/2/2020) for Recheck seizures.      Goals: keep doc appointments  Barriers to goals: pt compliance    Health Maintenance   Topic Date Due   • TDAP/TD VACCINES (1 - Tdap) 07/20/2009   • PNEUMOCOCCAL VACCINE (19-64 MEDIUM RISK) (1 of 1 - PPSV23) 07/20/2017   • HPV VACCINES (2 - Male 3-dose series) 08/02/2018   • DIABETIC FOOT EXAM  07/05/2019   • ANNUAL PHYSICAL  07/06/2019   • DIABETIC EYE EXAM  10/24/2019   • HEMOGLOBIN A1C  04/02/2020   • URINE MICROALBUMIN  10/02/2020   • INFLUENZA VACCINE  Addressed   • MENINGOCOCCAL VACCINE (Normal Risk)  Aged Out        Tobacco: nonsmoker  Alcohol: does not drink  Lifestyle: Body mass index is 21.01 kg/m². eat more fruits and vegetables, decrease soda or juice intake, increase water intake, increase physical activity, reduce screen time, reduce portion size, cut out extra servings, reduce fast food intake, family to eat at dinner table more often, keep TV off during meals,  plan meals, eat breakfast and have 3 meals a day    RISK SCORE: 3        This document has been electronically signed by Eitan Hutchison MD on January 2, 2020 4:23 PM

## 2020-01-02 NOTE — PROGRESS NOTES
I have seen the patient.  I have reviewed the notes, assessments, and/or procedures performed by Eitan Hutchison MD, I concur with her/his documentation and assessment and plan for Tomas Padron Scarlet.               This document has been electronically signed by Lavon Cartwright MD on January 2, 2020 4:33 PM     Patient Education     Return in 1 month for cryotherapy, if necessary      When Your Child Has Warts  Warts are small growths on the skin. They can appear anywhere on the body and be any size. Warts are harmless. But they may bother your child if they appear on areas such as the face or hands. Warts can often be treated at home. Talk to your child’s health care provider if you or your child has questions or concerns.  What causes warts?  Many warts are caused by the human papillomavirus (HPV). This virus can spread between people. But you can be exposed to the virus and not get warts.     Common (verruca) warts often appear on the hands.       Plantar warts appear on the feet.       Flat warts often appear on the face in small clusters.      What are common types of warts?  · Common (verruca) warts are cauliflower-shaped warts. They often appear on the hands and other parts of the body.  · Flat warts are raised, with smooth, flat tops. They often appear in clusters on the face and other parts of the body.  · Plantar warts appear on the soles of the feet. They can be very painful.     Note: Your child may have dome-shaped bumps with dimples in the middle. These bumps may look like warts, but they are likely caused by molluscum contagiosum. They require different treatment from warts. Ask your child’s health care provider for more information about how to treat this condition if you think your child has it.      How are warts diagnosed?  Warts are diagnosed by how they look and by their location. To get more information, the health care provider will ask about your child’s symptoms and health history. The health care provider will also examine your child. You will be told if any tests are needed. The health care provider will refer your child to a dermatologist (skin health care provider) or podiatrist (foot health care provider), if needed.  How are warts treated?  Warts generally go away on their own, but the amount  of time varies and may range from weeks to years. Speak with the health care provider about options to treat warts. These can include:  · Medicated creams. These can usually be bought over the counter or are prescribed by the health care provider. Use a pumice stone to remove dead skin above the wart before applying any medicine. A foot soak can also help soften the wart.  ·      With a plantar wart, soak your child’s foot and gently smooth down the wart with a pumice stone before applying any treatments.     Special cushions. These can be applied to the wart to relieve pressure and reduce pain.  · Occlusive therapy. Duct tape may reduce the time it takes for a wart to go away. Duct tape should be placed over the wart as instructed by the health care provider.  · Office procedures to remove a wart. These include surgery, cryotherapy (removal by freezing), or electrocautery (removal by burning).  It’s important to remember that even after treatment, it may take about 4 weeks to see results.  Call the health care provider  Contact your health care provider right away if you have any of the following:  · A wart that doesn’t respond to treatment  · A plantar wart that causes ankle, foot, or leg pain  · Signs of infection around a wart (pus, drainage, or bleeding)   © 3438-3002 [employer]. [address] [place] [address], [place] [address]. All rights reserved. This information is not intended as a substitute for professional medical care. Always follow your healthcare professional's instructions.

## 2020-01-03 ENCOUNTER — OFFICE VISIT (OUTPATIENT)
Dept: ENDOCRINOLOGY | Facility: CLINIC | Age: 20
End: 2020-01-03

## 2020-01-03 VITALS
SYSTOLIC BLOOD PRESSURE: 112 MMHG | WEIGHT: 131.3 LBS | HEIGHT: 66 IN | OXYGEN SATURATION: 98 % | HEART RATE: 117 BPM | BODY MASS INDEX: 21.1 KG/M2 | DIASTOLIC BLOOD PRESSURE: 72 MMHG

## 2020-01-03 DIAGNOSIS — E65 LIPOHYPERTROPHY: ICD-10-CM

## 2020-01-03 DIAGNOSIS — E10.9 TYPE 1 DIABETES MELLITUS WITHOUT COMPLICATION (HCC): Primary | ICD-10-CM

## 2020-01-03 DIAGNOSIS — G40.909 SEIZURE DISORDER (HCC): ICD-10-CM

## 2020-01-03 DIAGNOSIS — E55.9 VITAMIN D DEFICIENCY: ICD-10-CM

## 2020-01-03 LAB
GLUCOSE BLDC GLUCOMTR-MCNC: 6 MG/DL (ref 70–130)
HBA1C MFR BLD: 6.4 %

## 2020-01-03 PROCEDURE — 82962 GLUCOSE BLOOD TEST: CPT | Performed by: INTERNAL MEDICINE

## 2020-01-03 PROCEDURE — 83036 HEMOGLOBIN GLYCOSYLATED A1C: CPT | Performed by: INTERNAL MEDICINE

## 2020-01-03 PROCEDURE — 99214 OFFICE O/P EST MOD 30 MIN: CPT | Performed by: INTERNAL MEDICINE

## 2020-01-03 NOTE — PROGRESS NOTES
"Tomas Novak is a 19 y.o. male who presents for  evaluation of   Chief Complaint   Patient presents with   • Diabetes     bs 91         Primary Care / Referring Provider  Eitan Hutchison MD      Duration since 3 years old    Timing - Diabetes is Constant    Quality - Aic at goal but variability     Severity -  moderate    Complications - none    Current symptoms/problems  none     Alleviating Factors: Compliance      Side Effects  none    Current diet  in general, a \"healthy\" diet      Current exercise exercise    Current monitoring regimen: home blood tests - 4 times daily  Home blood sugar records:     Hypoglycemia nocturnal, exertional , if skipped meals  and has had syncope     Past Medical History:   Diagnosis Date   • Seizure disorder (CMS/[facility]) 1/5/2017   • Type 1 diabetes mellitus without complication (CMS/[facility]) 1/5/2017   • Vitamin D deficiency 1/5/2017     Family History   Problem Relation Age of Onset   • Diabetes type II Father      Social History     Tobacco Use   • Smoking status: Never Smoker   • Smokeless tobacco: Never Used   Substance Use Topics   • Alcohol use: Defer   • Drug use: Defer         Current Outpatient Medications:   •  Cholecalciferol 37305 units tablet, 50 thousand units po q 2 weeks, Disp: 2 tablet, Rfl: 11  •  glucose blood (ACCU-CHEK PATT) test strip, Used to check blood sugar up to 7 times daily, Disp: 240 each, Rfl: 11  •  Insulin Degludec (TRESIBA FLEXTOUCH) 200 UNIT/ML solution pen-injector pen injection, Inject 30 units under the skin every night at bedtime, Disp: 2 pen, Rfl: 11  •  Insulin Lispro (ADMELOG SOLOSTAR) 100 UNIT/ML solution pen-injector, 15 units subcutaneously qac tid, Disp: 5 pen, Rfl: 11  •  Insulin Pen Needle (B-D UF III MINI PEN NEEDLES) 31G X 5 MM misc, Use to inject insulin 4 times per day, Disp: 200 each, Rfl: 11  •  levETIRAcetam (KEPPRA) 500 MG tablet, Take 1 tablet by mouth Daily., Disp: 30 tablet, Rfl: 11  •  vitamin D " (ERGOCALCIFEROL) 82546 units capsule capsule, Take 1 capsule by mouth Every 7 (Seven) Days., Disp: 4 capsule, Rfl: 11    Review of Systems    Review of Systems   Constitutional: Negative for activity change, appetite change, chills, diaphoresis, fatigue, fever and unexpected weight change.   HENT: Negative for congestion, drooling, ear discharge, ear pain, facial swelling, mouth sores, nosebleeds, postnasal drip, rhinorrhea, sinus pressure, sneezing, sore throat, tinnitus, trouble swallowing and voice change.    Eyes: Negative for photophobia, pain, discharge, redness, itching and visual disturbance.   Respiratory: Negative for apnea, cough, choking, chest tightness, shortness of breath, wheezing and stridor.    Cardiovascular: Negative for chest pain, palpitations and leg swelling.   Gastrointestinal: Negative for abdominal distention, abdominal pain, anal bleeding, blood in stool, constipation, diarrhea, nausea, rectal pain and vomiting.   Endocrine: Negative for cold intolerance, heat intolerance, polydipsia, polyphagia and polyuria.   Genitourinary: Negative for difficulty urinating, dysuria, enuresis, flank pain, frequency, hematuria and urgency.   Musculoskeletal: Negative for arthralgias, back pain, gait problem, joint swelling, myalgias, neck pain and neck stiffness.   Skin: Negative for color change, pallor and wound.   Allergic/Immunologic: Negative for environmental allergies, food allergies and immunocompromised state.   Neurological: Negative for dizziness, tremors, seizures, syncope, facial asymmetry, speech difficulty, weakness, light-headedness, numbness and headaches.   Hematological: Negative for adenopathy. Does not bruise/bleed easily.   Psychiatric/Behavioral: Negative for agitation, behavioral problems, confusion, decreased concentration, dysphoric mood, hallucinations, self-injury, sleep disturbance and suicidal ideas. The patient is not nervous/anxious and is not hyperactive.        "  Objective:     /72   Pulse 117   Ht 167.6 cm (66\")   Wt 59.6 kg (131 lb 4.8 oz)   SpO2 98%   BMI 21.19 kg/m²     Physical Exam   Constitutional: He is oriented to person, place, and time. He appears well-developed and well-nourished. He is cooperative.   HENT:   Head: Normocephalic and atraumatic.   Right Ear: External ear normal.   Left Ear: External ear normal.   Nose: Nose normal.   Mouth/Throat: Oropharynx is clear and moist. No oropharyngeal exudate.   Eyes: Pupils are equal, round, and reactive to light. Conjunctivae and EOM are normal. No scleral icterus. Right eye exhibits normal extraocular motion. Left eye exhibits normal extraocular motion.   Neck: Neck supple. No JVD present. No muscular tenderness present. No tracheal deviation, no edema and no erythema present. No thyromegaly present.   Cardiovascular: Normal rate, regular rhythm, normal heart sounds and intact distal pulses. Exam reveals no gallop and no friction rub.   No murmur heard.  Pulmonary/Chest: Effort normal and breath sounds normal. No stridor. No respiratory distress. He has no decreased breath sounds. He has no wheezes. He has no rhonchi. He has no rales. He exhibits no tenderness.   Abdominal: Soft. Bowel sounds are normal. He exhibits no distension and no mass. There is no hepatomegaly. There is no tenderness. There is no rebound and no guarding. No hernia.   Musculoskeletal: Normal range of motion. He exhibits no edema, tenderness or deformity.   Lymphadenopathy:     He has no cervical adenopathy.   Neurological: He is alert and oriented to person, place, and time. He has normal reflexes. No cranial nerve deficit. He exhibits normal muscle tone. Coordination normal.   Skin: Skin is warm. No rash noted. No erythema. No pallor.   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    Psychiatric: He has a normal mood and affect. His behavior is normal. Judgment and thought content normal.   Nursing note and vitals reviewed.      Lab " Review    Results for orders placed or performed in visit on 01/03/20   POC Glycosylated Hemoglobin (Hb A1C)   Result Value Ref Range    Hemoglobin A1C 6.4 %   POC Glucose   Result Value Ref Range    Glucose 6 (A) 70 - 130 mg/dL           Assessment/Plan       ICD-10-CM ICD-9-CM   1. Type 1 diabetes mellitus without complication (CMS/[facility]) E10.9 250.01   2. Lipohypertrophy E65 278.1   3. Seizure disorder (CMS/[facility]) G40.909 345.90   4. Vitamin D deficiency E55.9 268.9       Glycemic Management:   Lab Results   Component Value Date    HGBA1C 6.4 01/03/2020    HGBA1C 6.6 10/02/2019    HGBA1C 6.50 (H) 06/12/2019     Lab Results   Component Value Date    GLUCOSE 91 10/02/2019    BUN 13 10/02/2019    CREATININE 0.75 (L) 10/02/2019    EGFRIFNONA 131 10/02/2019    BCR 17.3 10/02/2019    CO2 28.1 10/02/2019    CALCIUM 10.0 10/02/2019    ALBUMIN 5.00 10/02/2019    AST 17 10/02/2019    ALT 10 10/02/2019     Lab Results   Component Value Date    WBC 7.43 10/02/2019    HGB 16.5 10/02/2019    HCT 48.9 10/02/2019    MCV 84.0 10/02/2019     10/02/2019     Admelog    1 unit per 5 grams     1 to 12 correction     --      Tresiba 20 units, finally insurance paid       Not interested in dexcom or pump    I hope he agrees to cgms in the future    He is testing 8 x daily and has variability accounted for 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     Rx afrezza 4 to 32 units TID with meals , max daily dose 96 units       Lipid Management  Lab Results   Component Value Date    CHOL 128 10/02/2019    CHOL 147 06/12/2019    CHOL 135 10/20/2017     Lab Results   Component Value Date    TRIG 44 10/02/2019    TRIG 92 06/12/2019    TRIG 78 10/20/2017     Lab Results   Component Value Date    HDL 44 10/02/2019    HDL 42 06/12/2019    HDL 38 (L) 10/20/2017     No components found for: LDLCALC  Lab Results   Component Value Date    LDL 75 10/02/2019    LDL 87 06/12/2019    LDL 86 10/20/2017           Blood Pressure Management:    Vitals:    01/03/20  0827   BP: 112/72   Pulse: 117   SpO2: 98%     Lab Results   Component Value Date    GLUCOSE 91 10/02/2019    CALCIUM 10.0 10/02/2019     10/02/2019    K 4.1 10/02/2019    CO2 28.1 10/02/2019    CL 96 (L) 10/02/2019    BUN 13 10/02/2019    CREATININE 0.75 (L) 10/02/2019    EGFRIFNONA 131 10/02/2019    BCR 17.3 10/02/2019    ANIONGAP 14.9 10/02/2019     Lab Results   Component Value Date    GLUCOSE 91 10/02/2019    BUN 13 10/02/2019    CREATININE 0.75 (L) 10/02/2019    EGFRIFNONA 131 10/02/2019    BCR 17.3 10/02/2019    CO2 28.1 10/02/2019    CALCIUM 10.0 10/02/2019    ALBUMIN 5.00 10/02/2019    AST 17 10/02/2019    ALT 10 10/02/2019             Microvascular Complication Monitoring:      No complications           Immunizations:      No flu shot     Preventive Care:      Nonsmoking     Weight Related:   Wt Readings from Last 3 Encounters:   01/03/20 59.6 kg (131 lb 4.8 oz)   01/02/20 59.1 kg (130 lb 3.2 oz)   10/02/19 60.6 kg (133 lb 9.6 oz)     Body mass index is 21.19 kg/m².        Bone Health    Lab Results   Component Value Date    CALCIUM 10.0 10/02/2019       Assess vit D since on seizure meds.     Start vit D 50 th units weekly       Thyroid Health  Lab Results   Component Value Date    TSH 2.520 10/02/2019           Other Diabetes Related Aspects       Lab Results   Component Value Date    [id number] 615 10/02/2019           Seizure disorder    Refer to neurology  I will refill his keppra 500 mg daily since I don't want him to run out but he needs neurologist.     Depression    Refer to psychology / psychiatry       I reviewed and summarized records from Eitan Hutchison MD from present year  and I reviewed / ordered labs.     Orders Placed This Encounter   Procedures   • POC Glycosylated Hemoglobin (Hb A1C)   • POC Glucose         A copy of my note was sent to Eitan Hutchison MD    Please see my above opinion and suggestions.

## 2020-01-24 ENCOUNTER — OFFICE VISIT (OUTPATIENT)
Dept: FAMILY MEDICINE CLINIC | Facility: CLINIC | Age: 20
End: 2020-01-24

## 2020-01-24 DIAGNOSIS — F41.1 GENERALIZED ANXIETY DISORDER: Primary | ICD-10-CM

## 2020-01-24 PROCEDURE — 90791 PSYCH DIAGNOSTIC EVALUATION: CPT | Performed by: PSYCHOLOGIST
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
## 2020-02-04 DIAGNOSIS — E10.9 TYPE 1 DIABETES MELLITUS WITHOUT COMPLICATION (HCC): Primary | ICD-10-CM

## 2020-03-09 ENCOUNTER — HOSPITAL ENCOUNTER (OUTPATIENT)
Dept: MRI IMAGING | Facility: HOSPITAL | Age: 20
Discharge: HOME | End: 2020-03-09
Admitting: PSYCHIATRY & NEUROLOGY

## 2020-03-09 DIAGNOSIS — G40.309 GENERALIZED SEIZURE DISORDER (HCC): ICD-10-CM

## 2020-03-09 PROCEDURE — 25010000002 GADOTERIDOL PER 1 ML: Performed by: PSYCHIATRY & NEUROLOGY

## 2020-03-09 PROCEDURE — A9579 GAD-BASE MR CONTRAST NOS,1ML: HCPCS | Performed by: PSYCHIATRY & NEUROLOGY

## 2020-03-09 PROCEDURE — 70553 MRI BRAIN STEM W/O & W/DYE: CPT

## 2020-03-09 RX ADMIN — GADOTERIDOL 13 ML: 279.3 INJECTION, SOLUTION INTRAVENOUS at 16:05

## 2020-03-18 ENCOUNTER — OFFICE VISIT (OUTPATIENT)
Dept: FAMILY MEDICINE CLINIC | Facility: CLINIC | Age: 20
End: 2020-03-18

## 2020-03-18 DIAGNOSIS — F33.1 MAJOR DEPRESSIVE DISORDER, RECURRENT EPISODE, MODERATE (HCC): Primary | ICD-10-CM

## 2020-03-18 PROCEDURE — 90837 PSYTX W PT 60 MINUTES: CPT | Performed by: PSYCHOLOGIST

## 2020-03-18 NOTE — PROGRESS NOTES
PROGRESS NOTE  Data:  Tomas Novak was seen for their regularly scheduled individual psychotherapy session.    (Scales based on 0 - 10 with 10 being the worst)  Depression: 6 Anxiety: 1   Distress: 2 Sleep: 3   Tasks Completed on Time: 1 Mood: 6   Number of Panic Attacks: 3 Appetite: 1       Mental Status Exam  Appearance:  clean and casually dressed, appropriate  Attitude toward clinician:  cooperative and agreeable   Speech:    Rate:  regular rate and rhythm   Volume:  normal  Motor:  no abnormal movements present  Mood:  Sad  Affect:  mood congruent  Thought Processes:  linear, logical, and goal directed  Thought Content:  normal  Suicidal Thoughts:  absent  Homicidal Thoughts:  absent  Perceptual Disturbance: no perceptual disturbance  Attention and Concentration:  good  Insight and Judgement:  good  Memory:  memory appears to be intact    Patient's Support Network Includes:  He has several friends.  Assessment/Plan   Clinical Maneuvering/Intervention:  Therapist & client discussed: (1) types of mood disorders (dysthymia?), (2) the use of SSRIs, (3) the cause of depression, (4) the ways he can reduce depression, (5) the use of CBT, and (6) the long term effects of SSRIs.  Diagnoses and all orders for this visit:    Major depressive disorder, recurrent episode, moderate (CMS/HCC)    I spent 60 minutes in direct face to face contact with patient.  Greater than 50% of this time was spent counseling patient and discussing plan of care.  No follow-ups on file.

## 2020-03-23 RX ORDER — INSULIN LISPRO 100 U/ML
INJECTION, SOLUTION SUBCUTANEOUS
Qty: 15 ML | Refills: 6 | Status: SHIPPED | OUTPATIENT
Start: 2020-03-23 | End: 2020-05-01 | Stop reason: SDUPTHER

## 2020-04-06 ENCOUNTER — OFFICE VISIT (OUTPATIENT)
Dept: ENDOCRINOLOGY | Facility: CLINIC | Age: 20
End: 2020-04-06

## 2020-04-06 DIAGNOSIS — G40.909 SEIZURE DISORDER (HCC): ICD-10-CM

## 2020-04-06 DIAGNOSIS — F32.9 REACTIVE DEPRESSION: ICD-10-CM

## 2020-04-06 DIAGNOSIS — E53.8 B12 DEFICIENCY: ICD-10-CM

## 2020-04-06 DIAGNOSIS — E83.19 IRON EXCESS: ICD-10-CM

## 2020-04-06 DIAGNOSIS — E10.9 TYPE 1 DIABETES MELLITUS WITHOUT COMPLICATION (HCC): Primary | ICD-10-CM

## 2020-04-06 DIAGNOSIS — E55.9 VITAMIN D DEFICIENCY: ICD-10-CM

## 2020-04-06 PROCEDURE — 99213 OFFICE O/P EST LOW 20 MIN: CPT | Performed by: INTERNAL MEDICINE

## 2020-04-06 RX ORDER — ERGOCALCIFEROL 1.25 MG/1
50000 CAPSULE ORAL
Qty: 4 CAPSULE | Refills: 11 | Status: SHIPPED | OUTPATIENT
Start: 2020-04-06 | End: 2021-04-02 | Stop reason: SDUPTHER

## 2020-04-06 NOTE — PROGRESS NOTES
" Tomas Novak is a 19 y.o. male who presents for  evaluation of   Chief Complaint   Patient presents with   • Diabetes     This was a telephone encounter  Patient agreed to receive service through telephone as patient is being compliant with social distancing recommendations imparted by CDC.     Primary Care / Referring Provider  Eitan Hutchison MD      Duration since 3 years old    Timing - Diabetes is Constant    Quality - Aic at goal but variability     Severity -  moderate    Complications - none    Current symptoms/problems  none     Alleviating Factors: Compliance      Side Effects  none    Current diet  in general, a \"healthy\" diet      Current exercise exercise    Current monitoring regimen: home blood tests - 4 times daily  Home blood sugar records:     Hypoglycemia nocturnal, exertional , if skipped meals  and has had syncope     Past Medical History:   Diagnosis Date   • Seizure disorder (CMS/[facility]) 1/5/2017   • Type 1 diabetes mellitus without complication (CMS/[facility]) 1/5/2017   • Vitamin D deficiency 1/5/2017     Family History   Problem Relation Age of Onset   • Diabetes type II Father      Social History     Tobacco Use   • Smoking status: Never Smoker   • Smokeless tobacco: Never Used   Substance Use Topics   • Alcohol use: Defer   • Drug use: Defer         Current Outpatient Medications:   •  glucose blood (ACCU-CHEK PATT) test strip, 1 each by Other route 3 (Three) Times a Day. Used to check blood sugar up to 7 times daily, Disp: 100 each, Rfl: 11  •  Insulin Degludec (TRESIBA FLEXTOUCH) 200 UNIT/ML solution pen-injector pen injection, Inject 30 units under the skin every night at bedtime, Disp: 2 pen, Rfl: 11  •  Insulin Lispro (ADMELOG SOLOSTAR) 100 UNIT/ML injection pen, INJECT 25 UNITS UNDER THE SKIN THREE TIMES DAILY WITH MEALS, Disp: 15 mL, Rfl: 6  •  Insulin Lispro (ADMELOG SOLOSTAR) 100 UNIT/ML solution pen-injector, 15 units subcutaneously qac tid, Disp: 5 pen, Rfl: 11  •  " Insulin Pen Needle (B-D UF III MINI PEN NEEDLES) 31G X 5 MM misc, Use to inject insulin 4 times per day, Disp: 200 each, Rfl: 11  •  levETIRAcetam (KEPPRA) 500 MG tablet, Take 1 tablet by mouth Daily., Disp: 30 tablet, Rfl: 11  •  vitamin D (ERGOCALCIFEROL) 1.25 MG (00564 UT) capsule capsule, Take 1 capsule by mouth Every 7 (Seven) Days., Disp: 4 capsule, Rfl: 11    Review of Systems    Review of Systems   Constitutional: Negative for activity change, appetite change, chills, diaphoresis, fatigue, fever and unexpected weight change.   HENT: Negative for congestion, drooling, ear discharge, ear pain, facial swelling, mouth sores, nosebleeds, postnasal drip, rhinorrhea, sinus pressure, sneezing, sore throat, tinnitus, trouble swallowing and voice change.    Eyes: Negative for photophobia, pain, discharge, redness, itching and visual disturbance.   Respiratory: Negative for apnea, cough, choking, chest tightness, shortness of breath, wheezing and stridor.    Cardiovascular: Negative for chest pain, palpitations and leg swelling.   Gastrointestinal: Negative for abdominal distention, abdominal pain, anal bleeding, blood in stool, constipation, diarrhea, nausea, rectal pain and vomiting.   Endocrine: Negative for cold intolerance, heat intolerance, polydipsia, polyphagia and polyuria.   Genitourinary: Negative for difficulty urinating, dysuria, enuresis, flank pain, frequency, hematuria and urgency.   Musculoskeletal: Negative for arthralgias, back pain, gait problem, joint swelling, myalgias, neck pain and neck stiffness.   Skin: Negative for color change, pallor and wound.   Allergic/Immunologic: Negative for environmental allergies, food allergies and immunocompromised state.   Neurological: Negative for dizziness, tremors, seizures, syncope, facial asymmetry, speech difficulty, weakness, light-headedness, numbness and headaches.   Hematological: Negative for adenopathy. Does not bruise/bleed easily.    Psychiatric/Behavioral: Negative for agitation, behavioral problems, confusion, decreased concentration, dysphoric mood, hallucinations, self-injury, sleep disturbance and suicidal ideas. The patient is not nervous/anxious and is not hyperactive.         Objective:     There were no vitals taken for this visit.    Physical Exam    Lab Review    Results for orders placed or performed in visit on 01/03/20   POC Glycosylated Hemoglobin (Hb A1C)   Result Value Ref Range    Hemoglobin A1C 6.4 %   POC Glucose   Result Value Ref Range    Glucose 6 (A) 70 - 130 mg/dL           Assessment/Plan       ICD-10-CM ICD-9-CM   1. Type 1 diabetes mellitus without complication (CMS/[facility]) E10.9 250.01   2. Seizure disorder (CMS/[facility]) G40.909 345.90   3. Vitamin D deficiency E55.9 268.9   4. Reactive depression F32.9 300.4   5. Iron excess E83.19 275.09   6. B12 deficiency E53.8 266.2       Glycemic Management:   Lab Results   Component Value Date    HGBA1C 6.4 01/03/2020    HGBA1C 6.6 10/02/2019    HGBA1C 6.50 (H) 06/12/2019     Lab Results   Component Value Date    GLUCOSE 91 10/02/2019    BUN 13 10/02/2019    CREATININE 0.75 (L) 10/02/2019    EGFRIFNONA 131 10/02/2019    BCR 17.3 10/02/2019    CO2 28.1 10/02/2019    CALCIUM 10.0 10/02/2019    ALBUMIN 5.00 10/02/2019    AST 17 10/02/2019    ALT 10 10/02/2019     Lab Results   Component Value Date    WBC 7.43 10/02/2019    HGB 16.5 10/02/2019    HCT 48.9 10/02/2019    MCV 84.0 10/02/2019     10/02/2019     Admelog    1 unit per 5 grams     1 to 12 correction ( 8 /100)    --      Tresiba 20 units, finally insurance paid       Not interested in dexcom or pump    I hope he agrees to cgms in the future    He is testing 8 x daily and has variability accounted for 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     Rx afrezza 4 to 32 units TID with meals , max daily dose 96 units - never worked out, didn't like       Lipid Management  Lab Results   Component Value Date    CHOL 128 10/02/2019     CHOL 147 06/12/2019    CHOL 135 10/20/2017     Lab Results   Component Value Date    TRIG 44 10/02/2019    TRIG 92 06/12/2019    TRIG 78 10/20/2017     Lab Results   Component Value Date    HDL 44 10/02/2019    HDL 42 06/12/2019    HDL 38 (L) 10/20/2017     No components found for: LDLCALC  Lab Results   Component Value Date    LDL 75 10/02/2019    LDL 87 06/12/2019    LDL 86 10/20/2017           Blood Pressure Management:    There were no vitals filed for this visit.  Lab Results   Component Value Date    GLUCOSE 91 10/02/2019    CALCIUM 10.0 10/02/2019     10/02/2019    K 4.1 10/02/2019    CO2 28.1 10/02/2019    CL 96 (L) 10/02/2019    BUN 13 10/02/2019    CREATININE 0.75 (L) 10/02/2019    EGFRIFNONA 131 10/02/2019    BCR 17.3 10/02/2019    ANIONGAP 14.9 10/02/2019     Lab Results   Component Value Date    GLUCOSE 91 10/02/2019    BUN 13 10/02/2019    CREATININE 0.75 (L) 10/02/2019    EGFRIFNONA 131 10/02/2019    BCR 17.3 10/02/2019    CO2 28.1 10/02/2019    CALCIUM 10.0 10/02/2019    ALBUMIN 5.00 10/02/2019    AST 17 10/02/2019    ALT 10 10/02/2019             Microvascular Complication Monitoring:      No complications           Immunizations:      No flu shot     Preventive Care:      Nonsmoking     Weight Related:   Wt Readings from Last 3 Encounters:   03/09/20 59.4 kg (131 lb)   01/03/20 59.6 kg (131 lb 4.8 oz)   01/02/20 59.1 kg (130 lb 3.2 oz)     There is no height or weight on file to calculate BMI.        Bone Health    Lab Results   Component Value Date    CALCIUM 10.0 10/02/2019       Lab Results   Component Value Date    MPSJ25UY 64.4 10/02/2019    BCGV64XQ 21.7 (L) 06/12/2019    WLQH03IB <12.8 (L) 10/20/2017         Assess vit D since on seizure meds.     Start vit D 50 th units weekly       Thyroid Health  Lab Results   Component Value Date    TSH 2.520 10/02/2019           Other Diabetes Related Aspects       Lab Results   Component Value Date    XDHQIVDF54 615 10/02/2019           Seizure  disorder    Refer to neurology  I will refill his keppra 500 mg daily since I don't want him to run out but he needs neurologist.     Seeing Dr. Oquendo     Depression    Now Seeing Dr. Rory French reading elevated, repeat            I reviewed and summarized records from Eitan Hutchison MD from present year  and I reviewed / ordered labs.     Orders Placed This Encounter   Procedures   • CBC Auto Differential   • Comprehensive Metabolic Panel   • Hemoglobin A1c   • Lipid Panel   • Microalbumin / Creatinine Urine Ratio - Urine, Clean Catch   • TSH   • Vitamin B12   • Vitamin D 25 Hydroxy         A copy of my note was sent to Eitan Hutchison MD    Please see my above opinion and suggestions.       I spent 11 minutes reviewing patient electronic chart , reviewing medications , past history , active problems.   I provided advice regarding diabetes management, refilled prescriptions , ordered labs and arranged for future appointment.   Patient was advised to contact us if there were any unanswered questions or ongoing concerns.

## 2020-04-30 ENCOUNTER — TELEPHONE (OUTPATIENT)
Dept: FAMILY MEDICINE CLINIC | Facility: CLINIC | Age: 20
End: 2020-04-30

## 2020-04-30 NOTE — TELEPHONE ENCOUNTER
TRIED TO CALL PATIENT TO LET HIM KNOW THAT THE APPOINTMENT TIME HAS BEEN CHANGED  PM WITH DR SANTIAGO ON 05/01/2020 DUE TO DR SANTIAGO NOT BEING IN THE OFFICE IN THE AM. PATIENT DIDN'T HAVE A VOICEMAIL.      THANK YOU,      CHINYERE

## 2020-05-01 ENCOUNTER — OFFICE VISIT (OUTPATIENT)
Dept: FAMILY MEDICINE CLINIC | Facility: CLINIC | Age: 20
End: 2020-05-01

## 2020-05-01 VITALS — WEIGHT: 130 LBS | BODY MASS INDEX: 20.89 KG/M2 | HEIGHT: 66 IN

## 2020-05-01 DIAGNOSIS — E10.9 TYPE 1 DIABETES MELLITUS WITHOUT COMPLICATION (HCC): ICD-10-CM

## 2020-05-01 DIAGNOSIS — G40.909 SEIZURE DISORDER (HCC): ICD-10-CM

## 2020-05-01 DIAGNOSIS — E10.9 TYPE 1 DIABETES MELLITUS WITHOUT COMPLICATION (HCC): Primary | ICD-10-CM

## 2020-05-01 PROCEDURE — 99441 PR PHYS/QHP TELEPHONE EVALUATION 5-10 MIN: CPT | Performed by: STUDENT IN AN ORGANIZED HEALTH CARE EDUCATION/TRAINING PROGRAM

## 2020-05-01 RX ORDER — LEVETIRACETAM 500 MG/1
500 TABLET ORAL DAILY
Qty: 30 TABLET | Refills: 11 | Status: SHIPPED | OUTPATIENT
Start: 2020-05-01 | End: 2020-05-01 | Stop reason: SDUPTHER

## 2020-05-01 RX ORDER — LEVETIRACETAM 500 MG/1
500 TABLET ORAL DAILY
Qty: 30 TABLET | Refills: 11 | Status: SHIPPED | OUTPATIENT
Start: 2020-05-01 | End: 2021-04-02 | Stop reason: SDUPTHER

## 2020-05-01 RX ORDER — INSULIN LISPRO 100 U/ML
INJECTION, SOLUTION SUBCUTANEOUS
Qty: 15 ML | Refills: 6 | Status: SHIPPED | OUTPATIENT
Start: 2020-05-01 | End: 2021-04-02 | Stop reason: SDUPTHER

## 2020-05-01 RX ORDER — INSULIN LISPRO 100 U/ML
INJECTION, SOLUTION SUBCUTANEOUS
Qty: 15 ML | Refills: 6 | Status: SHIPPED | OUTPATIENT
Start: 2020-05-01 | End: 2020-05-01 | Stop reason: SDUPTHER

## 2020-05-01 NOTE — PROGRESS NOTES
You have chosen to receive care through a telephone visit. Do you consent to use a telephone visit for your medical care today? Yes    Subjective   Tomas Novak is a 19 y.o. male who presents for follow up for his DMT1 and seizure disorder. Pt states he is currently tolerating his medications well. Glucose is well controlled. No recent seizure events while on Keppra. Has been given a referral to neurology and has had 1 appointment, will follow up MRI results in 1 month. Otherwise doing well. No other concerns.       Past Medical Hx:  Past Medical History:   Diagnosis Date   • Seizure disorder (CMS/[facility]) 1/5/2017   • Type 1 diabetes mellitus without complication (CMS/[facility]) 1/5/2017   • Vitamin D deficiency 1/5/2017       Past Surgical Hx:  Past Surgical History:   Procedure Laterality Date   • NO PAST SURGERIES         Health Maintenance:  Health Maintenance   Topic Date Due   • TDAP/TD VACCINES (1 - Tdap) 07/20/2009   • HEPATITIS C SCREENING  04/05/2017   • PNEUMOCOCCAL VACCINE (19-64 MEDIUM RISK) (1 of 1 - PPSV23) 07/20/2017   • HPV VACCINES (2 - Male 3-dose series) 08/02/2018   • DIABETIC FOOT EXAM  07/05/2019   • ANNUAL PHYSICAL  07/06/2019   • DIABETIC EYE EXAM  10/24/2019   • HEMOGLOBIN A1C  07/03/2020   • INFLUENZA VACCINE  08/01/2020   • URINE MICROALBUMIN  10/02/2020   • MENINGOCOCCAL VACCINE (Normal Risk)  Aged Out       Current Meds:    Current Outpatient Medications:   •  Insulin Pen Needle (B-D UF III MINI PEN NEEDLES) 31G X 5 MM misc, Use to inject insulin 4 times per day, Disp: 200 each, Rfl: 11  •  vitamin D (ERGOCALCIFEROL) 1.25 MG (65017 UT) capsule capsule, Take 1 capsule by mouth Every 7 (Seven) Days., Disp: 4 capsule, Rfl: 11  •  glucose blood (Accu-Chek Maria L) test strip, 1 each by Other route 3 (Three) Times a Day. Used to check blood sugar up to 7 times daily, Disp: 100 each, Rfl: 11  •  Insulin Degludec (TRESIBA FLEXTOUCH) 200 UNIT/ML solution pen-injector pen  injection, Inject 30 units under the skin every night at bedtime, Disp: 2 pen, Rfl: 11  •  Insulin Lispro (ADMELOG SOLOSTAR) 100 UNIT/ML injection pen, INJECT 25 UNITS UNDER THE SKIN THREE TIMES DAILY WITH MEALS, Disp: 15 mL, Rfl: 6  •  Insulin Lispro (ADMELOG SOLOSTAR) 100 UNIT/ML solution pen-injector, 15 units subcutaneously qac tid, Disp: 5 pen, Rfl: 11  •  levETIRAcetam (KEPPRA) 500 MG tablet, Take 1 tablet by mouth Daily., Disp: 30 tablet, Rfl: 11    Allergies:  Patient has no known allergies.    Family Hx:  Family History   Problem Relation Age of Onset   • Diabetes type II Father         Social History:  Social History     Socioeconomic History   • Marital status: Single     Spouse name: Not on file   • Number of children: Not on file   • Years of education: Not on file   • Highest education level: Not on file   Tobacco Use   • Smoking status: Never Smoker   • Smokeless tobacco: Never Used   Substance and Sexual Activity   • Alcohol use: Defer   • Drug use: Defer   • Sexual activity: Defer       Review of Systems  Review of Systems   Constitutional: Negative for appetite change, chills, diaphoresis, fatigue and fever.   HENT: Negative for ear discharge, ear pain, facial swelling, hearing loss, rhinorrhea, sinus pressure, sinus pain, sneezing, sore throat and voice change.    Eyes: Negative for pain, discharge and visual disturbance.   Respiratory: Negative for apnea, cough, chest tightness, shortness of breath, wheezing and stridor.    Cardiovascular: Negative for chest pain, palpitations and leg swelling.   Gastrointestinal: Negative for abdominal distention, abdominal pain, constipation, diarrhea, nausea and vomiting.   Genitourinary: Negative for dysuria, frequency and urgency.   Musculoskeletal: Negative for arthralgias, back pain, myalgias and neck pain.   Skin: Negative for color change, rash and wound.   Neurological: Negative for facial asymmetry, speech difficulty, light-headedness and headaches.    "  Psychiatric/Behavioral: Negative for agitation and decreased concentration. The patient is not nervous/anxious.             Objective:     Ht 167.6 cm (66\")   Wt 59 kg (130 lb) Comment: reported  BMI 20.98 kg/m²       Assessment/Plan:     Tomas was seen today for seizures disorders.    Diagnoses and all orders for this visit:    Type 1 diabetes mellitus without complication (CMS/HCC)  -     Insulin Lispro (ADMELOG SOLOSTAR) 100 UNIT/ML injection pen; INJECT 25 UNITS UNDER THE SKIN THREE TIMES DAILY WITH MEALS  -     Insulin Degludec (TRESIBA FLEXTOUCH) 200 UNIT/ML solution pen-injector pen injection; Inject 30 units under the skin every night at bedtime  -     glucose blood (Accu-Chek Maria L) test strip; 1 each by Other route 3 (Three) Times a Day. Used to check blood sugar up to 7 times daily    Seizure disorder (CMS/HCC)  -     levETIRAcetam (KEPPRA) 500 MG tablet; Take 1 tablet by mouth Daily.         Follow-up:     Return in about 3 months (around 8/1/2020).    Goals     • Increase exercise (pt-stated)           Preventative:    Vaccines Recommended at this visit:   No Vaccines recommended today. Patient is up to date on all vaccines.     Vaccines Received at this visit:  No Vaccines recommended today. Patient is up to date on all vaccines.     Screenings Recommended at this visit:  No Screenings offered today. Patient is up to date on all screenings at this time.     Screenings Ordered at this visit:  No screenings were offered today. Patient is up to date on all screenings.     Smoking Status:  Patient has never smoked.    Alcohol Intake:  Regular (moderate)    Patient's Body mass index is 20.98 kg/m². BMI is below normal parameters. Recommendations include: increase caloric intake.         RISK SCORE: 2          This document has been electronically signed by Chacho Rae MD on May 1, 2020 14:34  10 minutes were spent on this phone visit          "

## 2020-05-11 NOTE — PROGRESS NOTES
I have helped formulate and discussed the assessment and plan with Dr.Umar Rae.  I have also spoken with the patient  I have spoken with the patient.  I have reviewed the notes, assessments, and/or procedures performed by Dr. Chacho Rae, I concur with his  documentation and assessment and plan for Tomas Novak.          This document has been electronically signed by August Keene MD on May 11, 2020 14:47

## 2020-05-18 ENCOUNTER — OFFICE VISIT (OUTPATIENT)
Dept: FAMILY MEDICINE CLINIC | Facility: CLINIC | Age: 20
End: 2020-05-18

## 2020-05-18 DIAGNOSIS — F33.1 MAJOR DEPRESSIVE DISORDER, RECURRENT EPISODE, MODERATE (HCC): Primary | ICD-10-CM

## 2020-05-18 PROCEDURE — 90837 PSYTX W PT 60 MINUTES: CPT | Performed by: PSYCHOLOGIST

## 2020-05-18 NOTE — PROGRESS NOTES
PROGRESS NOTE  Data:  Tomas Novak was seen for their regularly scheduled individual psychotherapy session.    This visit has been rescheduled as a phone visit to comply with patient safety concerns in accordance with CDC recommendations. Total time of discussion was 60 minutes.    You have chosen to receive care through a telephone visit. Do you consent to use a telephone visit for your medical care today? YES    (Scales based on 0 - 10 with 10 being the worst)  Depression: 4 Anxiety: 2   Distress: 2 Sleep: 1   Tasks Completed on Time: 4 Mood: 3   Number of Panic Attacks: 2 Appetite: 2     Mental Status Exam  Appearance:  clean and casually dressed, appropriate  Attitude toward clinician:  cooperative and agreeable   Speech:    Rate:  regular rate and rhythm   Volume:  normal  Motor:  no abnormal movements present  Mood:  sad  Affect:  mood congruent  Thought Processes:  linear, logical, and goal directed  Thought Content:  normal  Suicidal Thoughts:  absent  Homicidal Thoughts:  absent  Perceptual Disturbance: no perceptual disturbance  Attention and Concentration:  good  Insight and Judgement:  good  Memory:  memory appears to be intact    Patient's Support Network Includes:  He lives in the home with his mother, and sister.  Assessment/Plan   Clinical Maneuvering/Intervention:  Therapist & client discussed: (1) the use of CBT skills, (2) the grounding for his moral framework, (3) the belief of unfairness related to his sister moving back into the home (and bringing her dog), and (4) the ways he can manage his conflict with his sister.      Diagnoses and all orders for this visit:    Major depressive disorder, recurrent episode, moderate (CMS/HCC)      No follow-ups on file.

## 2020-06-30 ENCOUNTER — LAB (OUTPATIENT)
Dept: LAB | Facility: HOSPITAL | Age: 20
End: 2020-06-30

## 2020-06-30 ENCOUNTER — OFFICE VISIT (OUTPATIENT)
Dept: ENDOCRINOLOGY | Facility: CLINIC | Age: 20
End: 2020-06-30

## 2020-06-30 VITALS
SYSTOLIC BLOOD PRESSURE: 128 MMHG | OXYGEN SATURATION: 98 % | BODY MASS INDEX: 21.5 KG/M2 | DIASTOLIC BLOOD PRESSURE: 84 MMHG | WEIGHT: 133.8 LBS | HEART RATE: 93 BPM | HEIGHT: 66 IN

## 2020-06-30 DIAGNOSIS — E55.9 VITAMIN D DEFICIENCY: ICD-10-CM

## 2020-06-30 DIAGNOSIS — F32.9 REACTIVE DEPRESSION: ICD-10-CM

## 2020-06-30 DIAGNOSIS — G40.909 SEIZURE DISORDER (HCC): ICD-10-CM

## 2020-06-30 DIAGNOSIS — E10.9 TYPE 1 DIABETES MELLITUS WITHOUT COMPLICATION (HCC): Primary | ICD-10-CM

## 2020-06-30 LAB
25(OH)D3 SERPL-MCNC: 30.7 NG/ML (ref 30–100)
ALBUMIN UR-MCNC: 1.3 MG/DL
BASOPHILS # BLD AUTO: 0.06 10*3/MM3 (ref 0–0.2)
BASOPHILS NFR BLD AUTO: 1.3 % (ref 0–1.5)
CREAT UR-MCNC: 229 MG/DL
DEPRECATED RDW RBC AUTO: 38.7 FL (ref 37–54)
EOSINOPHIL # BLD AUTO: 0.11 10*3/MM3 (ref 0–0.4)
EOSINOPHIL NFR BLD AUTO: 2.4 % (ref 0.3–6.2)
ERYTHROCYTE [DISTWIDTH] IN BLOOD BY AUTOMATED COUNT: 12.9 % (ref 12.3–15.4)
HBA1C MFR BLD: 6.4 % (ref 4.8–5.6)
HCT VFR BLD AUTO: 44.8 % (ref 37.5–51)
HGB BLD-MCNC: 15.6 G/DL (ref 13–17.7)
IMM GRANULOCYTES # BLD AUTO: 0.01 10*3/MM3 (ref 0–0.05)
IMM GRANULOCYTES NFR BLD AUTO: 0.2 % (ref 0–0.5)
LYMPHOCYTES # BLD AUTO: 1.65 10*3/MM3 (ref 0.7–3.1)
LYMPHOCYTES NFR BLD AUTO: 35.5 % (ref 19.6–45.3)
MCH RBC QN AUTO: 29.2 PG (ref 26.6–33)
MCHC RBC AUTO-ENTMCNC: 34.8 G/DL (ref 31.5–35.7)
MCV RBC AUTO: 83.9 FL (ref 79–97)
MICROALBUMIN/CREAT UR: 5.7 MG/G
MONOCYTES # BLD AUTO: 0.38 10*3/MM3 (ref 0.1–0.9)
MONOCYTES NFR BLD AUTO: 8.2 % (ref 5–12)
NEUTROPHILS NFR BLD AUTO: 2.44 10*3/MM3 (ref 1.7–7)
NEUTROPHILS NFR BLD AUTO: 52.4 % (ref 42.7–76)
NRBC BLD AUTO-RTO: 0 /100 WBC (ref 0–0.2)
PLATELET # BLD AUTO: 259 10*3/MM3 (ref 140–450)
PMV BLD AUTO: 11.1 FL (ref 6–12)
RBC # BLD AUTO: 5.34 10*6/MM3 (ref 4.14–5.8)
VIT B12 BLD-MCNC: 1296 PG/ML (ref 211–946)
WBC # BLD AUTO: 4.65 10*3/MM3 (ref 3.4–10.8)

## 2020-06-30 PROCEDURE — 85025 COMPLETE CBC W/AUTO DIFF WBC: CPT | Performed by: INTERNAL MEDICINE

## 2020-06-30 PROCEDURE — 36415 COLL VENOUS BLD VENIPUNCTURE: CPT | Performed by: INTERNAL MEDICINE

## 2020-06-30 PROCEDURE — 80061 LIPID PANEL: CPT | Performed by: INTERNAL MEDICINE

## 2020-06-30 PROCEDURE — 83540 ASSAY OF IRON: CPT | Performed by: INTERNAL MEDICINE

## 2020-06-30 PROCEDURE — 82570 ASSAY OF URINE CREATININE: CPT | Performed by: INTERNAL MEDICINE

## 2020-06-30 PROCEDURE — 84466 ASSAY OF TRANSFERRIN: CPT | Performed by: INTERNAL MEDICINE

## 2020-06-30 PROCEDURE — 80053 COMPREHEN METABOLIC PANEL: CPT | Performed by: INTERNAL MEDICINE

## 2020-06-30 PROCEDURE — 99212 OFFICE O/P EST SF 10 MIN: CPT | Performed by: INTERNAL MEDICINE

## 2020-06-30 PROCEDURE — 82306 VITAMIN D 25 HYDROXY: CPT | Performed by: INTERNAL MEDICINE

## 2020-06-30 PROCEDURE — 84443 ASSAY THYROID STIM HORMONE: CPT | Performed by: INTERNAL MEDICINE

## 2020-06-30 PROCEDURE — 83036 HEMOGLOBIN GLYCOSYLATED A1C: CPT | Performed by: INTERNAL MEDICINE

## 2020-06-30 PROCEDURE — 82607 VITAMIN B-12: CPT | Performed by: INTERNAL MEDICINE

## 2020-06-30 PROCEDURE — 82043 UR ALBUMIN QUANTITATIVE: CPT | Performed by: INTERNAL MEDICINE

## 2020-06-30 NOTE — PROGRESS NOTES
" Tomas Novak is a 19 y.o. male who presents for  evaluation of   Chief Complaint   Patient presents with   • Follow-up     3 mo dominga type 1      This was a telephone encounter  Patient agreed to receive service through telephone as patient is being compliant with social distancing recommendations imparted by CDC.     Primary Care / Referring Provider  Eitan Hutchison MD      Duration since 3 years old    Timing - Diabetes is Constant    Quality - Aic at goal but variability     Severity -  moderate    Complications - none    Current symptoms/problems  none     Alleviating Factors: Compliance      Side Effects  none    Current diet  in general, a \"healthy\" diet      Current exercise exercise    Current monitoring regimen: home blood tests - 4 times daily  Home blood sugar records:     Hypoglycemia nocturnal, exertional , if skipped meals  and has had syncope     Past Medical History:   Diagnosis Date   • Seizure disorder (CMS/[facility]) 1/5/2017   • Type 1 diabetes mellitus without complication (CMS/[facility]) 1/5/2017   • Vitamin D deficiency 1/5/2017     Family History   Problem Relation Age of Onset   • Diabetes type II Father      Social History     Tobacco Use   • Smoking status: Never Smoker   • Smokeless tobacco: Never Used   Substance Use Topics   • Alcohol use: Defer   • Drug use: Defer         Current Outpatient Medications:   •  glucose blood (Accu-Chek Maria L) test strip, 1 each by Other route 3 (Three) Times a Day. Used to check blood sugar up to 7 times daily, Disp: 100 each, Rfl: 11  •  Insulin Degludec (TRESIBA FLEXTOUCH) 200 UNIT/ML solution pen-injector pen injection, Inject 30 units under the skin every night at bedtime, Disp: 2 pen, Rfl: 11  •  Insulin Lispro (ADMELOG SOLOSTAR) 100 UNIT/ML injection pen, INJECT 25 UNITS UNDER THE SKIN THREE TIMES DAILY WITH MEALS, Disp: 15 mL, Rfl: 6  •  Insulin Lispro (ADMELOG SOLOSTAR) 100 UNIT/ML solution pen-injector, 15 units subcutaneously qac tid, " Disp: 5 pen, Rfl: 11  •  Insulin Pen Needle (B-D UF III MINI PEN NEEDLES) 31G X 5 MM misc, Use to inject insulin 4 times per day, Disp: 200 each, Rfl: 11  •  levETIRAcetam (KEPPRA) 500 MG tablet, Take 1 tablet by mouth Daily., Disp: 30 tablet, Rfl: 11  •  vitamin D (ERGOCALCIFEROL) 1.25 MG (70878 UT) capsule capsule, Take 1 capsule by mouth Every 7 (Seven) Days., Disp: 4 capsule, Rfl: 11    Review of Systems    Review of Systems   Constitutional: Negative for activity change, appetite change, chills, diaphoresis, fatigue, fever and unexpected weight change.   HENT: Negative for congestion, drooling, ear discharge, ear pain, facial swelling, mouth sores, nosebleeds, postnasal drip, rhinorrhea, sinus pressure, sneezing, sore throat, tinnitus, trouble swallowing and voice change.    Eyes: Negative for photophobia, pain, discharge, redness, itching and visual disturbance.   Respiratory: Negative for apnea, cough, choking, chest tightness, shortness of breath, wheezing and stridor.    Cardiovascular: Negative for chest pain, palpitations and leg swelling.   Gastrointestinal: Negative for abdominal distention, abdominal pain, anal bleeding, blood in stool, constipation, diarrhea, nausea, rectal pain and vomiting.   Endocrine: Negative for cold intolerance, heat intolerance, polydipsia, polyphagia and polyuria.   Genitourinary: Negative for difficulty urinating, dysuria, enuresis, flank pain, frequency, hematuria and urgency.   Musculoskeletal: Negative for arthralgias, back pain, gait problem, joint swelling, myalgias, neck pain and neck stiffness.   Skin: Negative for color change, pallor and wound.   Allergic/Immunologic: Negative for environmental allergies, food allergies and immunocompromised state.   Neurological: Negative for dizziness, tremors, seizures, syncope, facial asymmetry, speech difficulty, weakness, light-headedness, numbness and headaches.   Hematological: Negative for adenopathy. Does not bruise/bleed  "easily.   Psychiatric/Behavioral: Negative for agitation, behavioral problems, confusion, decreased concentration, dysphoric mood, hallucinations, self-injury, sleep disturbance and suicidal ideas. The patient is not nervous/anxious and is not hyperactive.         Objective:     /84 (BP Location: Right arm, Patient Position: Sitting, Cuff Size: Large Adult)   Pulse 93   Ht 167.6 cm (66\")   Wt 60.7 kg (133 lb 12.8 oz)   SpO2 98%   BMI 21.60 kg/m²     Physical Exam   Constitutional: He is oriented to person, place, and time. He appears well-developed and well-nourished. He is cooperative.   HENT:   Head: Normocephalic and atraumatic.   Right Ear: External ear normal.   Left Ear: External ear normal.   Nose: Nose normal.   Mouth/Throat: Oropharynx is clear and moist. No oropharyngeal exudate.   Eyes: Pupils are equal, round, and reactive to light. Conjunctivae and EOM are normal. No scleral icterus. Right eye exhibits normal extraocular motion. Left eye exhibits normal extraocular motion.   Neck: Neck supple. No JVD present. No muscular tenderness present. No tracheal deviation, no edema and no erythema present. No thyromegaly present.   Cardiovascular: Normal rate, regular rhythm, normal heart sounds and intact distal pulses. Exam reveals no gallop and no friction rub.   No murmur heard.  Pulmonary/Chest: Effort normal and breath sounds normal. No stridor. No respiratory distress. He has no decreased breath sounds. He has no wheezes. He has no rhonchi. He has no rales. He exhibits no tenderness.   Abdominal: Soft. Bowel sounds are normal. He exhibits no distension and no mass. There is no hepatomegaly. There is no tenderness. There is no rebound and no guarding. No hernia.   Musculoskeletal: Normal range of motion. He exhibits no edema, tenderness or deformity.   Lymphadenopathy:     He has no cervical adenopathy.   Neurological: He is alert and oriented to person, place, and time. He has normal reflexes. " No cranial nerve deficit. He exhibits normal muscle tone. Coordination normal.   Skin: Skin is warm. No rash noted. No erythema. No pallor.   Psychiatric: He has a normal mood and affect. His behavior is normal. Judgment and thought content normal.   Nursing note and vitals reviewed.      Lab Review    Results for orders placed or performed in visit on 01/03/20   POC Glycosylated Hemoglobin (Hb A1C)   Result Value Ref Range    Hemoglobin A1C 6.4 %   POC Glucose   Result Value Ref Range    Glucose 6 (A) 70 - 130 mg/dL           Assessment/Plan       ICD-10-CM ICD-9-CM   1. Type 1 diabetes mellitus without complication (CMS/[facility]) E10.9 250.01   2. Seizure disorder (CMS/[facility]) G40.909 345.90   3. Vitamin D deficiency E55.9 268.9   4. Reactive depression F32.9 300.4       Glycemic Management:   Lab Results   Component Value Date    HGBA1C 6.4 01/03/2020    HGBA1C 6.6 10/02/2019    HGBA1C 6.50 (H) 06/12/2019     Lab Results   Component Value Date    GLUCOSE 91 10/02/2019    BUN 13 10/02/2019    CREATININE 0.75 (L) 10/02/2019    EGFRIFNONA 131 10/02/2019    BCR 17.3 10/02/2019    CO2 28.1 10/02/2019    CALCIUM 10.0 10/02/2019    ALBUMIN 5.00 10/02/2019    AST 17 10/02/2019    ALT 10 10/02/2019     Lab Results   Component Value Date    WBC 7.43 10/02/2019    HGB 16.5 10/02/2019    HCT 48.9 10/02/2019    MCV 84.0 10/02/2019     10/02/2019     Admelog    1 unit per 5 grams     1 to 12 correction ( 8 /100)    --      Tresiba 20 units, finally insurance paid       Not interested in dexcom or pump    I hope he agrees to cgms in the future    He is testing 8 x daily and has variability accounted for 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     Rx afrezza 4 to 32 units TID with meals , max daily dose 96 units - never worked out, didn't like       Lipid Management  Lab Results   Component Value Date    CHOL 128 10/02/2019    CHOL 147 06/12/2019    CHOL 135 10/20/2017     Lab Results   Component Value Date    TRIG 44 10/02/2019     TRIG 92 06/12/2019    TRIG 78 10/20/2017     Lab Results   Component Value Date    HDL 44 10/02/2019    HDL 42 06/12/2019    HDL 38 (L) 10/20/2017     No components found for: LDLCALC  Lab Results   Component Value Date    LDL 75 10/02/2019    LDL 87 06/12/2019    LDL 86 10/20/2017           Blood Pressure Management:    Vitals:    06/30/20 1019   BP: 128/84   Pulse: 93   SpO2: 98%     Lab Results   Component Value Date    GLUCOSE 91 10/02/2019    CALCIUM 10.0 10/02/2019     10/02/2019    K 4.1 10/02/2019    CO2 28.1 10/02/2019    CL 96 (L) 10/02/2019    BUN 13 10/02/2019    CREATININE 0.75 (L) 10/02/2019    EGFRIFNONA 131 10/02/2019    BCR 17.3 10/02/2019    ANIONGAP 14.9 10/02/2019     Lab Results   Component Value Date    GLUCOSE 91 10/02/2019    BUN 13 10/02/2019    CREATININE 0.75 (L) 10/02/2019    EGFRIFNONA 131 10/02/2019    BCR 17.3 10/02/2019    CO2 28.1 10/02/2019    CALCIUM 10.0 10/02/2019    ALBUMIN 5.00 10/02/2019    AST 17 10/02/2019    ALT 10 10/02/2019             Microvascular Complication Monitoring:      No complications           Immunizations:      No flu shot     Preventive Care:      Nonsmoking     Weight Related:   Wt Readings from Last 3 Encounters:   06/30/20 60.7 kg (133 lb 12.8 oz)   05/01/20 59 kg (130 lb)   03/09/20 59.4 kg (131 lb)     Body mass index is 21.6 kg/m².        Bone Health    Lab Results   Component Value Date    CALCIUM 10.0 10/02/2019       Lab Results   Component Value Date    XEXQ05VK 64.4 10/02/2019    HQMC80PB 21.7 (L) 06/12/2019    YIOX47XZ <12.8 (L) 10/20/2017         Assess vit D since on seizure meds.     Start vit D 50 th units weekly       Thyroid Health  Lab Results   Component Value Date    TSH 2.520 10/02/2019           Other Diabetes Related Aspects       Lab Results   Component Value Date    UFWGYHAO70 615 10/02/2019           Seizure disorder    Refer to neurology  I will refill his keppra 500 mg daily since I don't want him to run out but he needs  neurologist.     Seeing Dr. Jere Ruth    Now Seeing Dr. Rory French reading elevated, repeat            I reviewed and summarized records from Eitan Hutchison MD from present year  and I reviewed / ordered labs.     No orders of the defined types were placed in this encounter.        A copy of my note was sent to Eitan Hutchison MD    Please see my above opinion and suggestions.       I spent 11 minutes reviewing patient electronic chart , reviewing medications , past history , active problems.   I provided advice regarding diabetes management, refilled prescriptions , ordered labs and arranged for future appointment.   Patient was advised to contact us if there were any unanswered questions or ongoing concerns.

## 2020-07-01 ENCOUNTER — TELEPHONE (OUTPATIENT)
Dept: ENDOCRINOLOGY | Facility: CLINIC | Age: 20
End: 2020-07-01

## 2020-07-01 LAB
ALBUMIN SERPL-MCNC: 4.8 G/DL (ref 3.5–5.2)
ALBUMIN/GLOB SERPL: 1.7 G/DL
ALP SERPL-CCNC: 157 U/L (ref 39–117)
ALT SERPL W P-5'-P-CCNC: 13 U/L (ref 1–41)
ANION GAP SERPL CALCULATED.3IONS-SCNC: 12 MMOL/L (ref 5–15)
AST SERPL-CCNC: 18 U/L (ref 1–40)
BILIRUB SERPL-MCNC: 0.4 MG/DL (ref 0.2–1.2)
BUN SERPL-MCNC: 8 MG/DL (ref 6–20)
BUN/CREAT SERPL: 11.9 (ref 7–25)
CALCIUM SPEC-SCNC: 9.9 MG/DL (ref 8.6–10.5)
CHLORIDE SERPL-SCNC: 96 MMOL/L (ref 98–107)
CHOLEST SERPL-MCNC: 108 MG/DL (ref 0–200)
CO2 SERPL-SCNC: 27 MMOL/L (ref 22–29)
CREAT SERPL-MCNC: 0.67 MG/DL (ref 0.76–1.27)
GFR SERPL CREATININE-BSD FRML MDRD: 150 ML/MIN/1.73
GLOBULIN UR ELPH-MCNC: 2.8 GM/DL
GLUCOSE SERPL-MCNC: 220 MG/DL (ref 65–99)
HDLC SERPL-MCNC: 36 MG/DL (ref 40–60)
IRON 24H UR-MRATE: 87 MCG/DL (ref 59–158)
IRON SATN MFR SERPL: 24 % (ref 20–50)
LDLC SERPL CALC-MCNC: 50 MG/DL (ref 0–100)
LDLC/HDLC SERPL: 1.39 {RATIO}
POTASSIUM SERPL-SCNC: 4.4 MMOL/L (ref 3.5–5.2)
PROT SERPL-MCNC: 7.6 G/DL (ref 6–8.5)
SODIUM SERPL-SCNC: 135 MMOL/L (ref 136–145)
TIBC SERPL-MCNC: 367 MCG/DL (ref 298–536)
TRANSFERRIN SERPL-MCNC: 246 MG/DL (ref 200–360)
TRIGL SERPL-MCNC: 109 MG/DL (ref 0–150)
TSH SERPL DL<=0.05 MIU/L-ACNC: 3.74 UIU/ML (ref 0.27–4.2)
VLDLC SERPL-MCNC: 21.8 MG/DL (ref 5–40)

## 2020-07-01 NOTE — TELEPHONE ENCOUNTER
Pt left a voicemail stating that his test strips had been called in to [facility], and he wasn't sure what the issue is, but he was requesting a callback at [phone]

## 2020-07-02 DIAGNOSIS — E10.9 TYPE 1 DIABETES MELLITUS WITHOUT COMPLICATION (HCC): ICD-10-CM

## 2020-07-22 DIAGNOSIS — E10.9 TYPE 1 DIABETES MELLITUS WITHOUT COMPLICATION (HCC): ICD-10-CM

## 2020-09-01 ENCOUNTER — TELEPHONE (OUTPATIENT)
Dept: ENDOCRINOLOGY | Facility: CLINIC | Age: 20
End: 2020-09-01

## 2020-09-01 NOTE — TELEPHONE ENCOUNTER
Pt is needing his test strips to be updated to say 4x daily, if this is possible. Please let me know if you do this and I can call and let him know.

## 2020-09-02 ENCOUNTER — TELEPHONE (OUTPATIENT)
Dept: ENDOCRINOLOGY | Facility: CLINIC | Age: 20
End: 2020-09-02

## 2020-09-02 NOTE — TELEPHONE ENCOUNTER
Pt needs script for test strips Accu chek Maria L  increased to 4 X daily. Call into [facility] on [facility]    Not having enough for the month .    Thank You

## 2020-09-03 ENCOUNTER — TELEPHONE (OUTPATIENT)
Dept: ENDOCRINOLOGY | Facility: CLINIC | Age: 20
End: 2020-09-03

## 2020-09-03 DIAGNOSIS — E10.9 TYPE 1 DIABETES MELLITUS WITHOUT COMPLICATION (HCC): ICD-10-CM

## 2020-09-30 ENCOUNTER — OFFICE VISIT (OUTPATIENT)
Dept: ENDOCRINOLOGY | Facility: CLINIC | Age: 20
End: 2020-09-30

## 2020-09-30 VITALS
DIASTOLIC BLOOD PRESSURE: 84 MMHG | HEIGHT: 66 IN | SYSTOLIC BLOOD PRESSURE: 120 MMHG | HEART RATE: 85 BPM | WEIGHT: 132.5 LBS | BODY MASS INDEX: 21.29 KG/M2 | OXYGEN SATURATION: 97 %

## 2020-09-30 DIAGNOSIS — G40.909 SEIZURE DISORDER (HCC): ICD-10-CM

## 2020-09-30 DIAGNOSIS — E10.9 TYPE 1 DIABETES MELLITUS WITHOUT COMPLICATION (HCC): Primary | ICD-10-CM

## 2020-09-30 DIAGNOSIS — E55.9 VITAMIN D DEFICIENCY: ICD-10-CM

## 2020-09-30 PROCEDURE — 99214 OFFICE O/P EST MOD 30 MIN: CPT | Performed by: INTERNAL MEDICINE

## 2020-09-30 NOTE — PROGRESS NOTES
"Tomas Novak is a 20 y.o. male who presents for  evaluation of   Chief Complaint   Patient presents with   • Follow-up     3 mo rck type 1           Primary Care / Referring Provider  Chacho Rae MD      Duration since 3 years old    Timing - Diabetes is Constant    Quality - Aic at goal but variability     Severity -  moderate    Complications - none    Current symptoms/problems  none     Alleviating Factors: Compliance      Side Effects  none    Current diet  in general, a \"healthy\" diet      Current exercise exercise    Current monitoring regimen: home blood tests - 4 times daily  Home blood sugar records:     Hypoglycemia nocturnal, exertional , if skipped meals  and has had syncope     Past Medical History:   Diagnosis Date   • Seizure disorder (CMS/[facility]) 1/5/2017   • Type 1 diabetes mellitus without complication (CMS/[facility]) 1/5/2017   • Vitamin D deficiency 1/5/2017     Family History   Problem Relation Age of Onset   • Diabetes type II Father      Social History     Tobacco Use   • Smoking status: Never Smoker   • Smokeless tobacco: Never Used   Substance Use Topics   • Alcohol use: Defer   • Drug use: Defer         Current Outpatient Medications:   •  glucose blood (Accu-Chek Maria L) test strip, Used to check blood sugar up to 4 times daily, Disp: 200 each, Rfl: 11  •  Insulin Degludec (TRESIBA FLEXTOUCH) 200 UNIT/ML solution pen-injector pen injection, Inject 30 units under the skin every night at bedtime, Disp: 2 pen, Rfl: 11  •  Insulin Lispro (ADMELOG SOLOSTAR) 100 UNIT/ML injection pen, INJECT 25 UNITS UNDER THE SKIN THREE TIMES DAILY WITH MEALS, Disp: 15 mL, Rfl: 6  •  Insulin Pen Needle (B-D UF III MINI PEN NEEDLES) 31G X 5 MM misc, Use to inject insulin 4 times per day, Disp: 200 each, Rfl: 11  •  levETIRAcetam (KEPPRA) 500 MG tablet, Take 1 tablet by mouth Daily., Disp: 30 tablet, Rfl: 11  •  vitamin D (ERGOCALCIFEROL) 1.25 MG (51792 UT) capsule capsule, Take 1 capsule by mouth " Every 7 (Seven) Days., Disp: 4 capsule, Rfl: 11    Review of Systems    Review of Systems   Constitutional: Negative for activity change, appetite change, chills, diaphoresis, fatigue, fever and unexpected weight change.   HENT: Negative for congestion, drooling, ear discharge, ear pain, facial swelling, mouth sores, nosebleeds, postnasal drip, rhinorrhea, sinus pressure, sneezing, sore throat, tinnitus, trouble swallowing and voice change.    Eyes: Negative for photophobia, pain, discharge, redness, itching and visual disturbance.   Respiratory: Negative for apnea, cough, choking, chest tightness, shortness of breath, wheezing and stridor.    Cardiovascular: Negative for chest pain, palpitations and leg swelling.   Gastrointestinal: Negative for abdominal distention, abdominal pain, anal bleeding, blood in stool, constipation, diarrhea, nausea, rectal pain and vomiting.   Endocrine: Negative for cold intolerance, heat intolerance, polydipsia, polyphagia and polyuria.   Genitourinary: Negative for difficulty urinating, dysuria, enuresis, flank pain, frequency, hematuria and urgency.   Musculoskeletal: Negative for arthralgias, back pain, gait problem, joint swelling, myalgias, neck pain and neck stiffness.   Skin: Negative for color change, pallor and wound.   Allergic/Immunologic: Negative for environmental allergies, food allergies and immunocompromised state.   Neurological: Negative for dizziness, tremors, seizures, syncope, facial asymmetry, speech difficulty, weakness, light-headedness, numbness and headaches.   Hematological: Negative for adenopathy. Does not bruise/bleed easily.   Psychiatric/Behavioral: Negative for agitation, behavioral problems, confusion, decreased concentration, dysphoric mood, hallucinations, self-injury, sleep disturbance and suicidal ideas. The patient is not nervous/anxious and is not hyperactive.         Objective:     /84 (BP Location: Left arm, Patient Position: Sitting,  "Cuff Size: Large Adult)   Pulse 85   Ht 167.6 cm (66\")   Wt 60.1 kg (132 lb 8 oz)   SpO2 97%   BMI 21.39 kg/m²     Physical Exam   Constitutional: He is oriented to person, place, and time. He appears well-developed. He is cooperative.   HENT:   Head: Normocephalic and atraumatic.   Right Ear: External ear normal.   Left Ear: External ear normal.   Nose: Nose normal.   Mouth/Throat: No oropharyngeal exudate.   Eyes: Pupils are equal, round, and reactive to light. Conjunctivae are normal. No scleral icterus. Right eye exhibits normal extraocular motion. Left eye exhibits normal extraocular motion.   Neck: Neck supple. No JVD present. No muscular tenderness present. No tracheal deviation, no edema and no erythema present. No thyromegaly present.   Cardiovascular: Normal rate, regular rhythm and normal heart sounds. Exam reveals no gallop and no friction rub.   No murmur heard.  Pulmonary/Chest: Effort normal and breath sounds normal. No stridor. No respiratory distress. He has no decreased breath sounds. He has no wheezes. He has no rhonchi. He has no rales. He exhibits no tenderness.   Abdominal: Soft. Bowel sounds are normal. He exhibits no distension and no mass. There is no hepatomegaly. There is no abdominal tenderness. There is no rebound and no guarding. No hernia.   Musculoskeletal: Normal range of motion. No tenderness or deformity.   Lymphadenopathy:     He has no cervical adenopathy.   Neurological: He is alert and oriented to person, place, and time. He has normal reflexes. No cranial nerve deficit. He exhibits normal muscle tone. Coordination normal.   Skin: Skin is warm. No rash noted. No erythema. No pallor.   Psychiatric: His behavior is normal. Judgment and thought content normal.   Nursing note and vitals reviewed.      Lab Review    Results for orders placed or performed in visit on 04/06/20   CBC Auto Differential    Specimen: Blood   Result Value Ref Range    WBC 4.65 3.40 - 10.80 10*3/mm3    " RBC 5.34 4.14 - 5.80 10*6/mm3    Hemoglobin 15.6 13.0 - 17.7 g/dL    Hematocrit 44.8 37.5 - 51.0 %    MCV 83.9 79.0 - 97.0 fL    MCH 29.2 26.6 - 33.0 pg    MCHC 34.8 31.5 - 35.7 g/dL    RDW 12.9 12.3 - 15.4 %    RDW-SD 38.7 37.0 - 54.0 fl    MPV 11.1 6.0 - 12.0 fL    Platelets 259 140 - 450 10*3/mm3    Neutrophil % 52.4 42.7 - 76.0 %    Lymphocyte % 35.5 19.6 - 45.3 %    Monocyte % 8.2 5.0 - 12.0 %    Eosinophil % 2.4 0.3 - 6.2 %    Basophil % 1.3 0.0 - 1.5 %    Immature Grans % 0.2 0.0 - 0.5 %    Neutrophils, Absolute 2.44 1.70 - 7.00 10*3/mm3    Lymphocytes, Absolute 1.65 0.70 - 3.10 10*3/mm3    Monocytes, Absolute 0.38 0.10 - 0.90 10*3/mm3    Eosinophils, Absolute 0.11 0.00 - 0.40 10*3/mm3    Basophils, Absolute 0.06 0.00 - 0.20 10*3/mm3    Immature Grans, Absolute 0.01 0.00 - 0.05 10*3/mm3    nRBC 0.0 0.0 - 0.2 /100 WBC   Comprehensive Metabolic Panel    Specimen: Blood   Result Value Ref Range    Glucose 220 (H) 65 - 99 mg/dL    BUN 8 6 - 20 mg/dL    Creatinine 0.67 (L) 0.76 - 1.27 mg/dL    Sodium 135 (L) 136 - 145 mmol/L    Potassium 4.4 3.5 - 5.2 mmol/L    Chloride 96 (L) 98 - 107 mmol/L    CO2 27.0 22.0 - 29.0 mmol/L    Calcium 9.9 8.6 - 10.5 mg/dL    Total Protein 7.6 6.0 - 8.5 g/dL    Albumin 4.80 3.50 - 5.20 g/dL    ALT (SGPT) 13 1 - 41 U/L    AST (SGOT) 18 1 - 40 U/L    Alkaline Phosphatase 157 (H) 39 - 117 U/L    Total Bilirubin 0.4 0.2 - 1.2 mg/dL    eGFR Non African Amer 150 >60 mL/min/1.73    Globulin 2.8 gm/dL    A/G Ratio 1.7 g/dL    BUN/Creatinine Ratio 11.9 7.0 - 25.0    Anion Gap 12.0 5.0 - 15.0 mmol/L   Hemoglobin A1c    Specimen: Blood   Result Value Ref Range    Hemoglobin A1C 6.40 (H) 4.80 - 5.60 %   Lipid Panel    Specimen: Blood   Result Value Ref Range    Total Cholesterol 108 0 - 200 mg/dL    Triglycerides 109 0 - 150 mg/dL    HDL Cholesterol 36 (L) 40 - 60 mg/dL    LDL Cholesterol  50 0 - 100 mg/dL    VLDL Cholesterol 21.8 5 - 40 mg/dL    LDL/HDL Ratio 1.39    Microalbumin / Creatinine  Urine Ratio - Urine, Clean Catch    Specimen: Urine, Clean Catch   Result Value Ref Range    Microalbumin/Creatinine Ratio 5.7 mg/g    Creatinine, Urine 229.0 mg/dL    Microalbumin, Urine 1.3 mg/dL   TSH    Specimen: Blood   Result Value Ref Range    TSH 3.740 0.270 - 4.200 uIU/mL   Vitamin B12    Specimen: Blood   Result Value Ref Range    Vitamin B-12 1,296 (H) 211 - 946 pg/mL   Vitamin D 25 Hydroxy    Specimen: Blood   Result Value Ref Range    25 Hydroxy, Vitamin D 30.7 30.0 - 100.0 ng/ml   Iron Profile    Specimen: Blood   Result Value Ref Range    Iron 87 59 - 158 mcg/dL    Iron Saturation 24 20 - 50 %    Transferrin 246 200 - 360 mg/dL    TIBC 367 298 - 536 mcg/dL     Component      Latest Ref Rng & Units 10/20/2017   C-Peptide      1.1 - 4.4 ng/mL <0.1 (L)         Assessment/Plan       ICD-10-CM ICD-9-CM   1. Type 1 diabetes mellitus without complication (CMS/[facility])  E10.9 250.01   2. Vitamin D deficiency  E55.9 268.9   3. Seizure disorder (CMS/[facility])  G40.909 345.90       Glycemic Management:   Lab Results   Component Value Date    HGBA1C 6.40 (H) 06/30/2020    HGBA1C 6.4 01/03/2020    HGBA1C 6.6 10/02/2019     Lab Results   Component Value Date    GLUCOSE 220 (H) 06/30/2020    BUN 8 06/30/2020    CREATININE 0.67 (L) 06/30/2020    EGFRIFNONA 150 06/30/2020    BCR 11.9 06/30/2020    CO2 27.0 06/30/2020    CALCIUM 9.9 06/30/2020    ALBUMIN 4.80 06/30/2020    AST 18 06/30/2020    ALT 13 06/30/2020     Lab Results   Component Value Date    WBC 4.65 06/30/2020    HGB 15.6 06/30/2020    HCT 44.8 06/30/2020    MCV 83.9 06/30/2020     06/30/2020     Admelog    1 unit per 5 grams     1 to 12 correction ( 8 /100)    --      Tresiba 20 units, finally insurance paid          He is testing 8 x daily and has variability accounted for 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     Rx afrezza 4 to 32 units TID with meals , max daily dose 96 units - never worked out, didn't like     Agreed to sensor, dexcom    Approve for   Insulin pump and or Continuous Glucose Sensor     #1  Patient has diabetes mellitus, insulin-dependent.    #2 He performs blood glucose testing at least 4 times daily and blood glucose log was brought to office with variability from   #3  He is requiring  Basal insulin  and Prandial Insulin for a total of at least  4 injections or boluses per day and has been doing this for at least 6 months     #4 Patient tests blood sugars at least 4 times daily and makes frequent self-adjustments based on information provided by fingerstick and patient is injecting insulin at least 4 times daily. He has been doing this for more than 6 months . He tests frequently due to hypoglycemia and hyperglycemia.   He has frequent variability with activity     #5 I have personally seen patient within the past 6 months    #6 We plan on seeing her every 2-3 months for continuous adjustment of her diabetes regimen     #7 patient has hypoglycemia with episodes of unawareness.    #8 patient has day-to-day variation in her mealtime which confounds the degree of insulin dosing with multiple daily injections.    #9 patient has completed diabetes education program with us.    #10 He has demonstrated the ability to self monitor her glucose.        #11 Patient is motivated in improving  diabetes control           Lipid Management  Lab Results   Component Value Date    CHOL 108 06/30/2020    CHOL 128 10/02/2019    CHOL 147 06/12/2019     Lab Results   Component Value Date    TRIG 109 06/30/2020    TRIG 44 10/02/2019    TRIG 92 06/12/2019     Lab Results   Component Value Date    HDL 36 (L) 06/30/2020    HDL 44 10/02/2019    HDL 42 06/12/2019     No components found for: LDLCALC  Lab Results   Component Value Date    LDL 50 06/30/2020    LDL 75 10/02/2019    LDL 87 06/12/2019           Blood Pressure Management:    Vitals:    09/30/20 0852   BP: 120/84   Pulse: 85   SpO2: 97%     Lab Results   Component Value Date    GLUCOSE 220 (H) 06/30/2020     CALCIUM 9.9 06/30/2020     (L) 06/30/2020    K 4.4 06/30/2020    CO2 27.0 06/30/2020    CL 96 (L) 06/30/2020    BUN 8 06/30/2020    CREATININE 0.67 (L) 06/30/2020    EGFRIFNONA 150 06/30/2020    BCR 11.9 06/30/2020    ANIONGAP 12.0 06/30/2020     Lab Results   Component Value Date    GLUCOSE 220 (H) 06/30/2020    BUN 8 06/30/2020    CREATININE 0.67 (L) 06/30/2020    EGFRIFNONA 150 06/30/2020    BCR 11.9 06/30/2020    CO2 27.0 06/30/2020    CALCIUM 9.9 06/30/2020    ALBUMIN 4.80 06/30/2020    AST 18 06/30/2020    ALT 13 06/30/2020             Microvascular Complication Monitoring:      No complications           Immunizations:      No flu shot     Preventive Care:      Nonsmoking     Weight Related:   Wt Readings from Last 3 Encounters:   09/30/20 60.1 kg (132 lb 8 oz)   06/30/20 60.7 kg (133 lb 12.8 oz)   05/01/20 59 kg (130 lb)     Body mass index is 21.39 kg/m².        Bone Health    Lab Results   Component Value Date    CALCIUM 9.9 06/30/2020       Lab Results   Component Value Date    [id number] 30.7 06/30/2020    [id number] 64.4 10/02/2019    [id number] 21.7 (L) 06/12/2019         Assess vit D since on seizure meds.     Start vit D 50 th units weekly       Thyroid Health  Lab Results   Component Value Date    TSH 3.740 06/30/2020           Other Diabetes Related Aspects       Lab Results   Component Value Date    [id number] 1,296 (H) 06/30/2020           Seizure disorder    Refer to neurology  I will refill his keppra 500 mg daily since I don't want him to run out but he needs neurologist.     Seeing Dr. Oquendo     Depression    Now Seeing Dr. Valadez , no longer       Iron reading elevated, repeat is normal             I reviewed and summarized records from Chacho Rae MD from present year  and I reviewed / ordered labs.     No orders of the defined types were placed in this encounter.        A copy of my note was sent to Chacho Rae MD    Please see my above opinion and suggestions.

## 2020-10-02 ENCOUNTER — TELEPHONE (OUTPATIENT)
Dept: ENDOCRINOLOGY | Facility: CLINIC | Age: 20
End: 2020-10-02

## 2020-10-23 ENCOUNTER — DOCUMENTATION (OUTPATIENT)
Dept: ENDOCRINOLOGY | Facility: CLINIC | Age: 20
End: 2020-10-23

## 2020-12-23 ENCOUNTER — OFFICE VISIT (OUTPATIENT)
Dept: ENDOCRINOLOGY | Facility: CLINIC | Age: 20
End: 2020-12-23

## 2020-12-23 DIAGNOSIS — E10.9 TYPE 1 DIABETES MELLITUS WITHOUT COMPLICATION (HCC): Primary | ICD-10-CM

## 2020-12-23 DIAGNOSIS — R53.83 FATIGUE, UNSPECIFIED TYPE: ICD-10-CM

## 2020-12-23 DIAGNOSIS — E55.9 VITAMIN D DEFICIENCY: ICD-10-CM

## 2020-12-23 DIAGNOSIS — R33.9 URINE RETENTION: ICD-10-CM

## 2020-12-23 PROCEDURE — 99442 PR PHYS/QHP TELEPHONE EVALUATION 11-20 MIN: CPT | Performed by: NURSE PRACTITIONER

## 2020-12-23 NOTE — PROGRESS NOTES
" Tomas Novak is a 20 y.o. male who presents for  evaluation of   Chief Complaint   Patient presents with   • Diabetes     You have chosen to receive care through a telehealth visit.  Do you consent to use a video/audio connection for your medical care today? Yes     This telephone visit lasted 16 minutes     Primary Care / Referring Provider  Chacho Rae MD      Duration since 3 years old    Timing - Diabetes is Constant    Quality - Aic at goal but variability     Severity -  moderate    Complications - none    Current symptoms/problems  none     Alleviating Factors: Compliance      Side Effects  none    Current diet  in general, a \"healthy\" diet      Current exercise exercise    Current monitoring regimen: home blood tests - 4 times daily  Home blood sugar records:     Hypoglycemia nocturnal, exertional , if skipped meals  and has had syncope      Having issues with urine retention, no issues with blood sugar        Unable to assess Dexcom information     Past Medical History:   Diagnosis Date   • Seizure disorder (CMS/[facility]) 1/5/2017   • Type 1 diabetes mellitus without complication (CMS/[facility]) 1/5/2017   • Vitamin D deficiency 1/5/2017     Family History   Problem Relation Age of Onset   • Diabetes type II Father      Social History     Tobacco Use   • Smoking status: Never Smoker   • Smokeless tobacco: Never Used   Substance Use Topics   • Alcohol use: Defer   • Drug use: Defer         Current Outpatient Medications:   •  glucose blood (Accu-Chek Maria L) test strip, Used to check blood sugar up to 4 times daily, Disp: 200 each, Rfl: 11  •  Insulin Degludec (TRESIBA FLEXTOUCH) 200 UNIT/ML solution pen-injector pen injection, Inject 30 units under the skin every night at bedtime, Disp: 2 pen, Rfl: 11  •  Insulin Lispro (ADMELOG SOLOSTAR) 100 UNIT/ML injection pen, INJECT 25 UNITS UNDER THE SKIN THREE TIMES DAILY WITH MEALS, Disp: 15 mL, Rfl: 6  •  Insulin Pen Needle (B-D UF III MINI PEN " NEEDLES) 31G X 5 MM misc, Use to inject insulin 4 times per day, Disp: 200 each, Rfl: 11  •  levETIRAcetam (KEPPRA) 500 MG tablet, Take 1 tablet by mouth Daily., Disp: 30 tablet, Rfl: 11  •  vitamin D (ERGOCALCIFEROL) 1.25 MG (19610 UT) capsule capsule, Take 1 capsule by mouth Every 7 (Seven) Days., Disp: 4 capsule, Rfl: 11    Review of Systems    Review of Systems   Constitutional: Negative for activity change, appetite change, chills, diaphoresis, fatigue, fever and unexpected weight change.   HENT: Negative for congestion, drooling, ear discharge, ear pain, facial swelling, mouth sores, nosebleeds, postnasal drip, rhinorrhea, sinus pressure, sneezing, sore throat, tinnitus, trouble swallowing and voice change.    Eyes: Negative for photophobia, pain, discharge, redness, itching and visual disturbance.   Respiratory: Negative for apnea, cough, choking, chest tightness, shortness of breath, wheezing and stridor.    Cardiovascular: Negative for chest pain, palpitations and leg swelling.   Gastrointestinal: Negative for abdominal distention, abdominal pain, anal bleeding, blood in stool, constipation, diarrhea, nausea, rectal pain and vomiting.   Endocrine: Negative for cold intolerance, heat intolerance, polydipsia, polyphagia and polyuria.   Genitourinary: Negative for difficulty urinating, dysuria, enuresis, flank pain, frequency, hematuria and urgency.   Musculoskeletal: Negative for arthralgias, back pain, gait problem, joint swelling, myalgias, neck pain and neck stiffness.   Skin: Negative for color change, pallor and wound.   Allergic/Immunologic: Negative for environmental allergies, food allergies and immunocompromised state.   Neurological: Negative for dizziness, tremors, seizures, syncope, facial asymmetry, speech difficulty, weakness, light-headedness, numbness and headaches.   Hematological: Negative for adenopathy. Does not bruise/bleed easily.   Psychiatric/Behavioral: Negative for agitation,  behavioral problems, confusion, decreased concentration, dysphoric mood, hallucinations, self-injury, sleep disturbance and suicidal ideas. The patient is not nervous/anxious and is not hyperactive.         Objective:     There were no vitals taken for this visit.    Physical Exam   Constitutional: He is cooperative.   Psychiatric: His speech is normal and behavior is normal. Mood, memory and judgment normal.       Lab Review    Results for orders placed or performed in visit on 04/06/20   CBC Auto Differential    Specimen: Blood   Result Value Ref Range    WBC 4.65 3.40 - 10.80 10*3/mm3    RBC 5.34 4.14 - 5.80 10*6/mm3    Hemoglobin 15.6 13.0 - 17.7 g/dL    Hematocrit 44.8 37.5 - 51.0 %    MCV 83.9 79.0 - 97.0 fL    MCH 29.2 26.6 - 33.0 pg    MCHC 34.8 31.5 - 35.7 g/dL    RDW 12.9 12.3 - 15.4 %    RDW-SD 38.7 37.0 - 54.0 fl    MPV 11.1 6.0 - 12.0 fL    Platelets 259 140 - 450 10*3/mm3    Neutrophil % 52.4 42.7 - 76.0 %    Lymphocyte % 35.5 19.6 - 45.3 %    Monocyte % 8.2 5.0 - 12.0 %    Eosinophil % 2.4 0.3 - 6.2 %    Basophil % 1.3 0.0 - 1.5 %    Immature Grans % 0.2 0.0 - 0.5 %    Neutrophils, Absolute 2.44 1.70 - 7.00 10*3/mm3    Lymphocytes, Absolute 1.65 0.70 - 3.10 10*3/mm3    Monocytes, Absolute 0.38 0.10 - 0.90 10*3/mm3    Eosinophils, Absolute 0.11 0.00 - 0.40 10*3/mm3    Basophils, Absolute 0.06 0.00 - 0.20 10*3/mm3    Immature Grans, Absolute 0.01 0.00 - 0.05 10*3/mm3    nRBC 0.0 0.0 - 0.2 /100 WBC   Comprehensive Metabolic Panel    Specimen: Blood   Result Value Ref Range    Glucose 220 (H) 65 - 99 mg/dL    BUN 8 6 - 20 mg/dL    Creatinine 0.67 (L) 0.76 - 1.27 mg/dL    Sodium 135 (L) 136 - 145 mmol/L    Potassium 4.4 3.5 - 5.2 mmol/L    Chloride 96 (L) 98 - 107 mmol/L    CO2 27.0 22.0 - 29.0 mmol/L    Calcium 9.9 8.6 - 10.5 mg/dL    Total Protein 7.6 6.0 - 8.5 g/dL    Albumin 4.80 3.50 - 5.20 g/dL    ALT (SGPT) 13 1 - 41 U/L    AST (SGOT) 18 1 - 40 U/L    Alkaline Phosphatase 157 (H) 39 - 117 U/L     Total Bilirubin 0.4 0.2 - 1.2 mg/dL    eGFR Non African Amer 150 >60 mL/min/1.73    Globulin 2.8 gm/dL    A/G Ratio 1.7 g/dL    BUN/Creatinine Ratio 11.9 7.0 - 25.0    Anion Gap 12.0 5.0 - 15.0 mmol/L   Hemoglobin A1c    Specimen: Blood   Result Value Ref Range    Hemoglobin A1C 6.40 (H) 4.80 - 5.60 %   Lipid Panel    Specimen: Blood   Result Value Ref Range    Total Cholesterol 108 0 - 200 mg/dL    Triglycerides 109 0 - 150 mg/dL    HDL Cholesterol 36 (L) 40 - 60 mg/dL    LDL Cholesterol  50 0 - 100 mg/dL    VLDL Cholesterol 21.8 5 - 40 mg/dL    LDL/HDL Ratio 1.39    Microalbumin / Creatinine Urine Ratio - Urine, Clean Catch    Specimen: Urine, Clean Catch   Result Value Ref Range    Microalbumin/Creatinine Ratio 5.7 mg/g    Creatinine, Urine 229.0 mg/dL    Microalbumin, Urine 1.3 mg/dL   TSH    Specimen: Blood   Result Value Ref Range    TSH 3.740 0.270 - 4.200 uIU/mL   Vitamin B12    Specimen: Blood   Result Value Ref Range    Vitamin B-12 1,296 (H) 211 - 946 pg/mL   Vitamin D 25 Hydroxy    Specimen: Blood   Result Value Ref Range    25 Hydroxy, Vitamin D 30.7 30.0 - 100.0 ng/ml   Iron Profile    Specimen: Blood   Result Value Ref Range    Iron 87 59 - 158 mcg/dL    Iron Saturation 24 20 - 50 %    Transferrin 246 200 - 360 mg/dL    TIBC 367 298 - 536 mcg/dL     Component      Latest Ref Rng & Units 10/20/2017   C-Peptide      1.1 - 4.4 ng/mL <0.1 (L)         Assessment/Plan       ICD-10-CM ICD-9-CM   1. Type 1 diabetes mellitus without complication (CMS/HCC)  E10.9 250.01   2. Vitamin D deficiency  E55.9 268.9   3. Urine retention  R33.9 788.20   4. Fatigue, unspecified type  R53.83 780.79       Glycemic Management:   Lab Results   Component Value Date    HGBA1C 6.40 (H) 06/30/2020    HGBA1C 6.4 01/03/2020    HGBA1C 6.6 10/02/2019     Lab Results   Component Value Date    GLUCOSE 220 (H) 06/30/2020    BUN 8 06/30/2020    CREATININE 0.67 (L) 06/30/2020    EGFRIFNONA 150 06/30/2020    BCR 11.9 06/30/2020    CO2  27.0 06/30/2020    CALCIUM 9.9 06/30/2020    ALBUMIN 4.80 06/30/2020    AST 18 06/30/2020    ALT 13 06/30/2020     Lab Results   Component Value Date    WBC 4.65 06/30/2020    HGB 15.6 06/30/2020    HCT 44.8 06/30/2020    MCV 83.9 06/30/2020     06/30/2020     Admelog    1 unit per 5 grams     1 to 12 correction ( 8 /100)    --      Tresiba 20 units      We will keep these doses, they are working well for him     Lipid Management  Lab Results   Component Value Date    CHOL 108 06/30/2020    CHOL 128 10/02/2019    CHOL 147 06/12/2019     Lab Results   Component Value Date    TRIG 109 06/30/2020    TRIG 44 10/02/2019    TRIG 92 06/12/2019     Lab Results   Component Value Date    HDL 36 (L) 06/30/2020    HDL 44 10/02/2019    HDL 42 06/12/2019     No components found for: LDLCALC  Lab Results   Component Value Date    LDL 50 06/30/2020    LDL 75 10/02/2019    LDL 87 06/12/2019           Blood Pressure Management:    There were no vitals filed for this visit.  Lab Results   Component Value Date    GLUCOSE 220 (H) 06/30/2020    CALCIUM 9.9 06/30/2020     (L) 06/30/2020    K 4.4 06/30/2020    CO2 27.0 06/30/2020    CL 96 (L) 06/30/2020    BUN 8 06/30/2020    CREATININE 0.67 (L) 06/30/2020    EGFRIFNONA 150 06/30/2020    BCR 11.9 06/30/2020    ANIONGAP 12.0 06/30/2020     Lab Results   Component Value Date    GLUCOSE 220 (H) 06/30/2020    BUN 8 06/30/2020    CREATININE 0.67 (L) 06/30/2020    EGFRIFNONA 150 06/30/2020    BCR 11.9 06/30/2020    CO2 27.0 06/30/2020    CALCIUM 9.9 06/30/2020    ALBUMIN 4.80 06/30/2020    AST 18 06/30/2020    ALT 13 06/30/2020       Urinary Retention  Issues emptying bladder    Complete ordered lab work- I will call with results     Microvascular Complication Monitoring:      No complications     Immunizations:      No flu shot     Preventive Care:      Nonsmoking     Weight Related:   Wt Readings from Last 3 Encounters:   09/30/20 60.1 kg (132 lb 8 oz)   06/30/20 60.7 kg (133 lb  12.8 oz)   05/01/20 59 kg (130 lb)     There is no height or weight on file to calculate BMI.        Bone Health    Lab Results   Component Value Date    CALCIUM 9.9 06/30/2020       Lab Results   Component Value Date    [id number] 30.7 06/30/2020    [id number] 64.4 10/02/2019    [id number] 21.7 (L) 06/12/2019         Assess vit D since on seizure meds.     vit D 50 th units weekly       Thyroid Health  Lab Results   Component Value Date    TSH 3.740 06/30/2020           Other Diabetes Related Aspects       Lab Results   Component Value Date    [id number] 1,296 (H) 06/30/2020           Seizure disorder     Seeing Dr. Oquendo         Orders Placed This Encounter   Procedures   • TSH     Standing Status:   Future     Standing Expiration Date:   12/24/2021   • Vitamin D 25 Hydroxy     Standing Status:   Future     Standing Expiration Date:   12/23/2021   • Comprehensive Metabolic Panel     Standing Status:   Future     Standing Expiration Date:   12/23/2021   • Hemoglobin A1c     Standing Status:   Future     Standing Expiration Date:   12/23/2021   • Vitamin B12     Standing Status:   Future     Standing Expiration Date:   12/23/2021   • Lipid Panel     These lab test should be done fasting. This means do not eat or drink for at least 12 hours prior to getting your blood drawn.     Standing Status:   Future     Standing Expiration Date:   12/23/2021   • Testosterone, Bioavailable (M)     Standing Status:   Future     Standing Expiration Date:   12/23/2021   • Testosterone     Standing Status:   Future     Standing Expiration Date:   12/23/2021   • PSA Diagnostic     Standing Status:   Future     Standing Expiration Date:   12/23/2021   • CBC & Differential     Standing Status:   Future     Standing Expiration Date:   12/23/2021     Order Specific Question:   Manual Differential     Answer:   No   • Urinalysis With Microscopic - Urine, Clean Catch     Standing Status:   Future     Standing Expiration Date:   12/23/2021      Return in about 3 months (around 3/23/2021), or if symptoms worsen or fail to improve, for Recheck.        This document has been electronically signed by ALIS Mcleod on December 23, 2020 17:02 CST          A copy of my note was sent to Chacho Rae MD    Please see my above opinion and suggestions.

## 2020-12-29 ENCOUNTER — LAB (OUTPATIENT)
Dept: LAB | Facility: HOSPITAL | Age: 20
End: 2020-12-29

## 2020-12-29 DIAGNOSIS — E55.9 VITAMIN D DEFICIENCY: ICD-10-CM

## 2020-12-29 DIAGNOSIS — R53.83 FATIGUE, UNSPECIFIED TYPE: ICD-10-CM

## 2020-12-29 DIAGNOSIS — R33.9 URINE RETENTION: ICD-10-CM

## 2020-12-29 DIAGNOSIS — E10.9 TYPE 1 DIABETES MELLITUS WITHOUT COMPLICATION (HCC): ICD-10-CM

## 2020-12-29 PROCEDURE — 85025 COMPLETE CBC W/AUTO DIFF WBC: CPT

## 2020-12-29 PROCEDURE — 82607 VITAMIN B-12: CPT

## 2020-12-29 PROCEDURE — 84403 ASSAY OF TOTAL TESTOSTERONE: CPT

## 2020-12-29 PROCEDURE — 36415 COLL VENOUS BLD VENIPUNCTURE: CPT

## 2020-12-29 PROCEDURE — 83036 HEMOGLOBIN GLYCOSYLATED A1C: CPT

## 2020-12-29 PROCEDURE — 84410 TESTOSTERONE BIOAVAILABLE: CPT

## 2020-12-29 PROCEDURE — 80053 COMPREHEN METABOLIC PANEL: CPT

## 2020-12-29 PROCEDURE — 84153 ASSAY OF PSA TOTAL: CPT

## 2020-12-29 PROCEDURE — 84443 ASSAY THYROID STIM HORMONE: CPT

## 2020-12-29 PROCEDURE — 80061 LIPID PANEL: CPT

## 2020-12-29 PROCEDURE — 82306 VITAMIN D 25 HYDROXY: CPT

## 2020-12-30 LAB
25(OH)D3 SERPL-MCNC: 54.1 NG/ML (ref 30–100)
ALBUMIN SERPL-MCNC: 4.6 G/DL (ref 3.5–5.2)
ALBUMIN/GLOB SERPL: 1.5 G/DL
ALP SERPL-CCNC: 145 U/L (ref 39–117)
ALT SERPL W P-5'-P-CCNC: 14 U/L (ref 1–41)
ANION GAP SERPL CALCULATED.3IONS-SCNC: 7.8 MMOL/L (ref 5–15)
AST SERPL-CCNC: 16 U/L (ref 1–40)
BASOPHILS # BLD AUTO: 0.04 10*3/MM3 (ref 0–0.2)
BASOPHILS NFR BLD AUTO: 1.1 % (ref 0–1.5)
BILIRUB SERPL-MCNC: 0.5 MG/DL (ref 0–1.2)
BUN SERPL-MCNC: 19 MG/DL (ref 6–20)
BUN/CREAT SERPL: 27.1 (ref 7–25)
CALCIUM SPEC-SCNC: 9.7 MG/DL (ref 8.6–10.5)
CHLORIDE SERPL-SCNC: 100 MMOL/L (ref 98–107)
CHOLEST SERPL-MCNC: 114 MG/DL (ref 0–200)
CO2 SERPL-SCNC: 31.2 MMOL/L (ref 22–29)
CREAT SERPL-MCNC: 0.7 MG/DL (ref 0.76–1.27)
DEPRECATED RDW RBC AUTO: 40.1 FL (ref 37–54)
EOSINOPHIL # BLD AUTO: 0.15 10*3/MM3 (ref 0–0.4)
EOSINOPHIL NFR BLD AUTO: 4 % (ref 0.3–6.2)
ERYTHROCYTE [DISTWIDTH] IN BLOOD BY AUTOMATED COUNT: 12.9 % (ref 12.3–15.4)
GFR SERPL CREATININE-BSD FRML MDRD: 141 ML/MIN/1.73
GLOBULIN UR ELPH-MCNC: 3 GM/DL
GLUCOSE SERPL-MCNC: 143 MG/DL (ref 65–99)
HBA1C MFR BLD: 6.2 % (ref 4.8–5.6)
HCT VFR BLD AUTO: 50 % (ref 37.5–51)
HDLC SERPL-MCNC: 37 MG/DL (ref 40–60)
HGB BLD-MCNC: 16.8 G/DL (ref 13–17.7)
IMM GRANULOCYTES # BLD AUTO: 0.01 10*3/MM3 (ref 0–0.05)
IMM GRANULOCYTES NFR BLD AUTO: 0.3 % (ref 0–0.5)
LDLC SERPL CALC-MCNC: 64 MG/DL (ref 0–100)
LDLC/HDLC SERPL: 1.77 {RATIO}
LYMPHOCYTES # BLD AUTO: 1.31 10*3/MM3 (ref 0.7–3.1)
LYMPHOCYTES NFR BLD AUTO: 34.6 % (ref 19.6–45.3)
MCH RBC QN AUTO: 29 PG (ref 26.6–33)
MCHC RBC AUTO-ENTMCNC: 33.6 G/DL (ref 31.5–35.7)
MCV RBC AUTO: 86.4 FL (ref 79–97)
MONOCYTES # BLD AUTO: 0.31 10*3/MM3 (ref 0.1–0.9)
MONOCYTES NFR BLD AUTO: 8.2 % (ref 5–12)
NEUTROPHILS NFR BLD AUTO: 1.97 10*3/MM3 (ref 1.7–7)
NEUTROPHILS NFR BLD AUTO: 51.8 % (ref 42.7–76)
NRBC BLD AUTO-RTO: 0 /100 WBC (ref 0–0.2)
PLATELET # BLD AUTO: 250 10*3/MM3 (ref 140–450)
PMV BLD AUTO: 11 FL (ref 6–12)
POTASSIUM SERPL-SCNC: 4.1 MMOL/L (ref 3.5–5.2)
PROT SERPL-MCNC: 7.6 G/DL (ref 6–8.5)
PSA SERPL-MCNC: 0.37 NG/ML (ref 0–4)
RBC # BLD AUTO: 5.79 10*6/MM3 (ref 4.14–5.8)
SODIUM SERPL-SCNC: 139 MMOL/L (ref 136–145)
TESTOST SERPL-MCNC: 868 NG/DL (ref 249–836)
TRIGL SERPL-MCNC: 58 MG/DL (ref 0–150)
TSH SERPL DL<=0.05 MIU/L-ACNC: 1.64 UIU/ML (ref 0.27–4.2)
VIT B12 BLD-MCNC: 1694 PG/ML (ref 211–946)
VLDLC SERPL-MCNC: 13 MG/DL (ref 5–40)
WBC # BLD AUTO: 3.79 10*3/MM3 (ref 3.4–10.8)

## 2021-01-09 LAB
TESTOST SERPL-MCNC: 874 NG/DL
TESTOSTERONE.FREE+WB MFR SERPL: 28.3 %
TESTOSTERONE.FREE+WB SERPL-MCNC: 247 NG/DL

## 2021-01-14 ENCOUNTER — TELEPHONE (OUTPATIENT)
Dept: ENDOCRINOLOGY | Facility: CLINIC | Age: 21
End: 2021-01-14

## 2021-03-29 ENCOUNTER — IMMUNIZATION (OUTPATIENT)
Dept: VACCINE CLINIC | Facility: HOSPITAL | Age: 21
End: 2021-03-29

## 2021-03-29 PROCEDURE — 0001A: CPT | Performed by: THORACIC SURGERY (CARDIOTHORACIC VASCULAR SURGERY)

## 2021-03-29 PROCEDURE — 91300 HC SARSCOV02 VAC 30MCG/0.3ML IM: CPT | Performed by: THORACIC SURGERY (CARDIOTHORACIC VASCULAR SURGERY)

## 2021-04-02 ENCOUNTER — TELEMEDICINE (OUTPATIENT)
Dept: ENDOCRINOLOGY | Facility: CLINIC | Age: 21
End: 2021-04-02

## 2021-04-02 DIAGNOSIS — E10.9 TYPE 1 DIABETES MELLITUS WITHOUT COMPLICATION (HCC): Primary | ICD-10-CM

## 2021-04-02 DIAGNOSIS — N30.10 INTERSTITIAL CYSTITIS: ICD-10-CM

## 2021-04-02 DIAGNOSIS — G40.909 SEIZURE DISORDER (HCC): ICD-10-CM

## 2021-04-02 PROCEDURE — 99214 OFFICE O/P EST MOD 30 MIN: CPT | Performed by: INTERNAL MEDICINE

## 2021-04-02 RX ORDER — INSULIN LISPRO 100 U/ML
INJECTION, SOLUTION SUBCUTANEOUS
Qty: 15 ML | Refills: 6 | Status: SHIPPED | OUTPATIENT
Start: 2021-04-02 | End: 2021-08-31

## 2021-04-02 RX ORDER — FLURBIPROFEN SODIUM 0.3 MG/ML
SOLUTION/ DROPS OPHTHALMIC
Qty: 200 EACH | Refills: 11 | Status: SHIPPED | OUTPATIENT
Start: 2021-04-02 | End: 2022-09-19 | Stop reason: SDUPTHER

## 2021-04-02 RX ORDER — ERGOCALCIFEROL 1.25 MG/1
50000 CAPSULE ORAL
Qty: 4 CAPSULE | Refills: 11 | Status: SHIPPED | OUTPATIENT
Start: 2021-04-02 | End: 2022-04-07

## 2021-04-02 RX ORDER — LEVETIRACETAM 500 MG/1
500 TABLET ORAL DAILY
Qty: 30 TABLET | Refills: 11 | Status: SHIPPED | OUTPATIENT
Start: 2021-04-02 | End: 2022-05-02 | Stop reason: SDUPTHER

## 2021-04-02 NOTE — PROGRESS NOTES
" Tomas Novak is a 20 y.o. male who presents for  evaluation of type 1 diabetes                                        This was a Telehealth Encounter. Benefits and Disadvantages of a Telehealth Visit were discussed and accepted by patient. .  Patient agreed to receive service through Telehealth visit as patient is being compliant with social distancing recommendations imparted by CDC.     You have chosen to receive care through a telehealth visit.  Do you consent to use a video/audio connection for your medical care today? Yes          Primary Care / Referring Provider  Eitan Hutchison MD      Duration since 3 years old    Timing - Diabetes is Constant    Quality - Aic at goal      Severity -  moderate    Complications - none    Current symptoms/problems  none     Alleviating Factors: Compliance      Side Effects  none    Current diet  in general, a \"healthy\" diet      Current exercise exercise    Current monitoring regimen: home blood tests - using dexcom      Review of Systems     Objective:     There were no vitals taken for this visit.    Physical Exam   Constitutional: He is cooperative.   Psychiatric: His speech is normal and behavior is normal. Mood, memory and judgment normal.       Lab Review      Component      Latest Ref Rng & Units 10/20/2017   C-Peptide      1.1 - 4.4 ng/mL <0.1 (L)         Assessment/Plan       ICD-10-CM ICD-9-CM   1. Type 1 diabetes mellitus without complication (CMS/[facility])  E10.9 250.01   2. Interstitial cystitis  N30.10 595.1       Glycemic Management:   Lab Results   Component Value Date    HGBA1C 6.20 (H) 12/29/2020    HGBA1C 6.40 (H) 06/30/2020    HGBA1C 6.4 01/03/2020     Lab Results   Component Value Date    GLUCOSE 143 (H) 12/29/2020    BUN 19 12/29/2020    CREATININE 0.70 (L) 12/29/2020    EGFRIFNONA 141 12/29/2020    BCR 27.1 (H) 12/29/2020    CO2 31.2 (H) 12/29/2020    CALCIUM 9.7 12/29/2020    ALBUMIN 4.60 12/29/2020    AST 16 12/29/2020    ALT 14 12/29/2020 "     Lab Results   Component Value Date    WBC 3.79 12/29/2020    HGB 16.8 12/29/2020    HCT 50.0 12/29/2020    MCV 86.4 12/29/2020     12/29/2020     Admelog    1 unit per 5 grams     1 to 12 correction ( 8 /100)    --      Tresiba 20 units      We will keep these doses, they are working well for him     Lipid Management  Lab Results   Component Value Date    CHOL 114 12/29/2020    CHOL 108 06/30/2020    CHOL 128 10/02/2019     Lab Results   Component Value Date    TRIG 58 12/29/2020    TRIG 109 06/30/2020    TRIG 44 10/02/2019     Lab Results   Component Value Date    HDL 37 (L) 12/29/2020    HDL 36 (L) 06/30/2020    HDL 44 10/02/2019     No components found for: LDLCALC  Lab Results   Component Value Date    LDL 64 12/29/2020    LDL 50 06/30/2020    LDL 75 10/02/2019           Blood Pressure Management:    There were no vitals filed for this visit.  Lab Results   Component Value Date    GLUCOSE 143 (H) 12/29/2020    CALCIUM 9.7 12/29/2020     12/29/2020    K 4.1 12/29/2020    CO2 31.2 (H) 12/29/2020     12/29/2020    BUN 19 12/29/2020    CREATININE 0.70 (L) 12/29/2020    EGFRIFNONA 141 12/29/2020    BCR 27.1 (H) 12/29/2020    ANIONGAP 7.8 12/29/2020     Lab Results   Component Value Date    GLUCOSE 143 (H) 12/29/2020    BUN 19 12/29/2020    CREATININE 0.70 (L) 12/29/2020    EGFRIFNONA 141 12/29/2020    BCR 27.1 (H) 12/29/2020    CO2 31.2 (H) 12/29/2020    CALCIUM 9.7 12/29/2020    ALBUMIN 4.60 12/29/2020    AST 16 12/29/2020    ALT 14 12/29/2020       Urinary Retention  Issues emptying bladder, I consider has interstitial cystitis     Complete ordered lab work- I will call with results     Microvascular Complication Monitoring:      No complications     Immunizations:      No flu shot     Preventive Care:      Nonsmoking     Weight Related:   Wt Readings from Last 3 Encounters:   09/30/20 60.1 kg (132 lb 8 oz)   06/30/20 60.7 kg (133 lb 12.8 oz)   05/01/20 59 kg (130 lb)     There is no height  or weight on file to calculate BMI.        Bone Health    Lab Results   Component Value Date    CALCIUM 9.7 12/29/2020       Lab Results   Component Value Date    [id number] 54.1 12/29/2020    [id number] 30.7 06/30/2020    [id number] 64.4 10/02/2019         Assess vit D since on seizure meds.     vit D 50 th units weekly       Thyroid Health  Lab Results   Component Value Date    TSH 1.640 12/29/2020           Other Diabetes Related Aspects       Lab Results   Component Value Date    [id number] 1,694 (H) 12/29/2020           Seizure disorder     Seeing Dr. Oquendo         No orders of the defined types were placed in this encounter.    No follow-ups on file.        This document has been electronically signed by Lenin Davis MD on April 2, 2021 14:29 CDT          A copy of my note was sent to Eitan Hutchison MD    Please see my above opinion and suggestions.

## 2021-04-19 ENCOUNTER — IMMUNIZATION (OUTPATIENT)
Dept: VACCINE CLINIC | Facility: HOSPITAL | Age: 21
End: 2021-04-19

## 2021-04-19 PROCEDURE — 91300 HC SARSCOV02 VAC 30MCG/0.3ML IM: CPT | Performed by: THORACIC SURGERY (CARDIOTHORACIC VASCULAR SURGERY)

## 2021-04-19 PROCEDURE — 0002A: CPT | Performed by: THORACIC SURGERY (CARDIOTHORACIC VASCULAR SURGERY)

## 2021-07-01 ENCOUNTER — LAB (OUTPATIENT)
Dept: LAB | Facility: HOSPITAL | Age: 21
End: 2021-07-01

## 2021-07-01 ENCOUNTER — OFFICE VISIT (OUTPATIENT)
Dept: ENDOCRINOLOGY | Facility: CLINIC | Age: 21
End: 2021-07-01

## 2021-07-01 VITALS
OXYGEN SATURATION: 99 % | HEIGHT: 66 IN | DIASTOLIC BLOOD PRESSURE: 60 MMHG | WEIGHT: 128 LBS | SYSTOLIC BLOOD PRESSURE: 100 MMHG | HEART RATE: 60 BPM | BODY MASS INDEX: 20.57 KG/M2

## 2021-07-01 DIAGNOSIS — G40.909 SEIZURE DISORDER (HCC): ICD-10-CM

## 2021-07-01 DIAGNOSIS — N30.10 INTERSTITIAL CYSTITIS: ICD-10-CM

## 2021-07-01 DIAGNOSIS — E55.9 VITAMIN D DEFICIENCY: ICD-10-CM

## 2021-07-01 DIAGNOSIS — E10.9 TYPE 1 DIABETES MELLITUS WITHOUT COMPLICATION (HCC): Primary | ICD-10-CM

## 2021-07-01 DIAGNOSIS — R33.9 URINE RETENTION: ICD-10-CM

## 2021-07-01 LAB
ALBUMIN SERPL-MCNC: 4.6 G/DL (ref 3.5–5.2)
ALBUMIN/GLOB SERPL: 1.9 G/DL
ALP SERPL-CCNC: 137 U/L (ref 39–117)
ALT SERPL W P-5'-P-CCNC: 18 U/L (ref 1–41)
ANION GAP SERPL CALCULATED.3IONS-SCNC: 11.1 MMOL/L (ref 5–15)
AST SERPL-CCNC: 20 U/L (ref 1–40)
BILIRUB SERPL-MCNC: 0.3 MG/DL (ref 0–1.2)
BILIRUB UR QL STRIP: NEGATIVE
BUN SERPL-MCNC: 9 MG/DL (ref 6–20)
BUN/CREAT SERPL: 16.4 (ref 7–25)
CALCIUM SPEC-SCNC: 9.1 MG/DL (ref 8.6–10.5)
CHLORIDE SERPL-SCNC: 101 MMOL/L (ref 98–107)
CLARITY UR: CLEAR
CO2 SERPL-SCNC: 26.9 MMOL/L (ref 22–29)
COLOR UR: YELLOW
CREAT SERPL-MCNC: 0.55 MG/DL (ref 0.76–1.27)
GFR SERPL CREATININE-BSD FRML MDRD: >150 ML/MIN/1.73
GLOBULIN UR ELPH-MCNC: 2.4 GM/DL
GLUCOSE SERPL-MCNC: 176 MG/DL (ref 65–99)
GLUCOSE UR STRIP-MCNC: NEGATIVE MG/DL
HBA1C MFR BLD: 6.19 % (ref 4.8–5.6)
HGB UR QL STRIP.AUTO: NEGATIVE
KETONES UR QL STRIP: NEGATIVE
LEUKOCYTE ESTERASE UR QL STRIP.AUTO: NEGATIVE
NITRITE UR QL STRIP: NEGATIVE
PH UR STRIP.AUTO: 6 [PH] (ref 5–8)
POTASSIUM SERPL-SCNC: 4.1 MMOL/L (ref 3.5–5.2)
PROT SERPL-MCNC: 7 G/DL (ref 6–8.5)
PROT UR QL STRIP: ABNORMAL
SODIUM SERPL-SCNC: 139 MMOL/L (ref 136–145)
SP GR UR STRIP: 1.02 (ref 1–1.03)
UROBILINOGEN UR QL STRIP: ABNORMAL

## 2021-07-01 PROCEDURE — 36415 COLL VENOUS BLD VENIPUNCTURE: CPT | Performed by: INTERNAL MEDICINE

## 2021-07-01 PROCEDURE — 80053 COMPREHEN METABOLIC PANEL: CPT | Performed by: INTERNAL MEDICINE

## 2021-07-01 PROCEDURE — 85025 COMPLETE CBC W/AUTO DIFF WBC: CPT | Performed by: INTERNAL MEDICINE

## 2021-07-01 PROCEDURE — 84153 ASSAY OF PSA TOTAL: CPT | Performed by: INTERNAL MEDICINE

## 2021-07-01 PROCEDURE — 81003 URINALYSIS AUTO W/O SCOPE: CPT | Performed by: INTERNAL MEDICINE

## 2021-07-01 PROCEDURE — 83036 HEMOGLOBIN GLYCOSYLATED A1C: CPT | Performed by: INTERNAL MEDICINE

## 2021-07-01 PROCEDURE — 99214 OFFICE O/P EST MOD 30 MIN: CPT | Performed by: INTERNAL MEDICINE

## 2021-07-01 NOTE — PROGRESS NOTES
" Tomas Novak is a 20 y.o. male who presents for  evaluation of type 1 diabetes                                      Primary Care / Referring Provider  Marcia Laureano MD      Duration since 3 years old    Timing - Diabetes is Constant    Quality - Aic at goal      Severity -  moderate    Complications - none    Current symptoms/problems  Urgency and frequency     Alleviating Factors: Compliance      Side Effects  none    Current diet  in general, a \"healthy\" diet      Current exercise exercise    Current monitoring regimen: home blood tests - using dexcom        Objective:     /60   Pulse 60   Ht 167.6 cm (66\")   Wt 58.1 kg (128 lb)   SpO2 99%   BMI 20.66 kg/m²   AOx3  No goiter , no bruit  Skin acne  RRR  CTA  Abdomen , soft NT  Ext no edema  Nl pulses     Lab Review      Component      Latest Ref Rng & Units 10/20/2017   C-Peptide      1.1 - 4.4 ng/mL <0.1 (L)         Assessment/Plan       ICD-10-CM ICD-9-CM   1. Type 1 diabetes mellitus without complication (CMS/[facility])  E10.9 250.01       Glycemic Management:   Lab Results   Component Value Date    HGBA1C 6.20 (H) 12/29/2020    HGBA1C 6.40 (H) 06/30/2020    HGBA1C 6.4 01/03/2020     Lab Results   Component Value Date    GLUCOSE 143 (H) 12/29/2020    BUN 19 12/29/2020    CREATININE 0.70 (L) 12/29/2020    EGFRIFNONA 141 12/29/2020    BCR 27.1 (H) 12/29/2020    CO2 31.2 (H) 12/29/2020    CALCIUM 9.7 12/29/2020    ALBUMIN 4.60 12/29/2020    AST 16 12/29/2020    ALT 14 12/29/2020     Lab Results   Component Value Date    WBC 3.79 12/29/2020    HGB 16.8 12/29/2020    HCT 50.0 12/29/2020    MCV 86.4 12/29/2020     12/29/2020     Admelog    1 unit per 5 grams     1 to 12.5 correction ( 8 /100)    --      Tresiba 20 units      We will keep these doses, they are working well for him     Lipid Management  Lab Results   Component Value Date    CHOL 114 12/29/2020    CHOL 108 06/30/2020    CHOL 128 10/02/2019     Lab Results   Component Value Date    " TRIG 58 12/29/2020    TRIG 109 06/30/2020    TRIG 44 10/02/2019     Lab Results   Component Value Date    HDL 37 (L) 12/29/2020    HDL 36 (L) 06/30/2020    HDL 44 10/02/2019     No components found for: LDLCALC  Lab Results   Component Value Date    LDL 64 12/29/2020    LDL 50 06/30/2020    LDL 75 10/02/2019           Blood Pressure Management:    Vitals:    07/01/21 0817   BP: 100/60   Pulse: 60   SpO2: 99%       Lab Results   Component Value Date    GLUCOSE 143 (H) 12/29/2020    BUN 19 12/29/2020    CREATININE 0.70 (L) 12/29/2020    EGFRIFNONA 141 12/29/2020    BCR 27.1 (H) 12/29/2020    CO2 31.2 (H) 12/29/2020    CALCIUM 9.7 12/29/2020    ALBUMIN 4.60 12/29/2020    AST 16 12/29/2020    ALT 14 12/29/2020       Urinary Retention  Issues emptying bladder, I consider has interstitial cystitis     Complete ordered lab work- I will call with results     Refer to urologist     Microvascular Complication Monitoring:      No complications     Immunizations:      No flu shot     Preventive Care:      Nonsmoking     Weight Related:   Wt Readings from Last 3 Encounters:   07/01/21 58.1 kg (128 lb)   09/30/20 60.1 kg (132 lb 8 oz)   06/30/20 60.7 kg (133 lb 12.8 oz)     Body mass index is 20.66 kg/m².        Bone Health    Lab Results   Component Value Date    CALCIUM 9.7 12/29/2020       Lab Results   Component Value Date    UMZQ31OD 54.1 12/29/2020    [id number] 30.7 06/30/2020    [id number] 64.4 10/02/2019         Assess vit D since on seizure meds.     vit D 50 th units weekly       Thyroid Health  Lab Results   Component Value Date    TSH 1.640 12/29/2020           Other Diabetes Related Aspects       Lab Results   Component Value Date    [id number] 1,694 (H) 12/29/2020           Seizure disorder     Seeing Dr. Oquendo         No orders of the defined types were placed in this encounter.    No follow-ups on file.        This document has been electronically signed by Lenin Davis MD on July 1, 2021 08:31  CDT          A copy of my note was sent to Marcia Laureano MD    Please see my above opinion and suggestions.

## 2021-07-02 LAB
BASOPHILS # BLD AUTO: 0.05 10*3/MM3 (ref 0–0.2)
BASOPHILS NFR BLD AUTO: 1.2 % (ref 0–1.5)
DEPRECATED RDW RBC AUTO: 40.3 FL (ref 37–54)
EOSINOPHIL # BLD AUTO: 0.13 10*3/MM3 (ref 0–0.4)
EOSINOPHIL NFR BLD AUTO: 3 % (ref 0.3–6.2)
ERYTHROCYTE [DISTWIDTH] IN BLOOD BY AUTOMATED COUNT: 13 % (ref 12.3–15.4)
HCT VFR BLD AUTO: 48.6 % (ref 37.5–51)
HGB BLD-MCNC: 16.3 G/DL (ref 13–17.7)
LYMPHOCYTES # BLD AUTO: 1.51 10*3/MM3 (ref 0.7–3.1)
LYMPHOCYTES NFR BLD AUTO: 35.1 % (ref 19.6–45.3)
MCH RBC QN AUTO: 29.1 PG (ref 26.6–33)
MCHC RBC AUTO-ENTMCNC: 33.5 G/DL (ref 31.5–35.7)
MCV RBC AUTO: 86.8 FL (ref 79–97)
MONOCYTES # BLD AUTO: 0.32 10*3/MM3 (ref 0.1–0.9)
MONOCYTES NFR BLD AUTO: 7.4 % (ref 5–12)
NEUTROPHILS NFR BLD AUTO: 2.25 10*3/MM3 (ref 1.7–7)
NEUTROPHILS NFR BLD AUTO: 52.4 % (ref 42.7–76)
PLATELET # BLD AUTO: 140 10*3/MM3 (ref 140–450)
PMV BLD AUTO: 13.7 FL (ref 6–12)
PSA SERPL-MCNC: 0.3 NG/ML (ref 0–4)
RBC # BLD AUTO: 5.6 10*6/MM3 (ref 4.14–5.8)
REFLEX: NORMAL
WBC # BLD AUTO: 4.3 10*3/MM3 (ref 3.4–10.8)

## 2021-08-30 NOTE — PROGRESS NOTES
Family Medicine Residency  Marcia Laureano MD    Subjective:     Tomas Novak is a 21 y.o. male with concurrent medical history of Type 1 DM who presents for diabetes follow up. Patient has not been seen in the office for over a year.   Patient follows up with  and last saw him on 7/1/21. Currently takes Admelog (insulin lispro) 1 unit per 5 grams of carb. 1:12.5 correction. Tresiba 20 units. Last Hb A1C was 6.19 on 7/1/21     {Common ambulatory SmartLinks:22829}    Past Medical Hx:  Past Medical History:   Diagnosis Date   • Seizure disorder (CMS/HCC) 1/5/2017   • Type 1 diabetes mellitus without complication (CMS/[facility]) 1/5/2017   • Vitamin D deficiency 1/5/2017       Past Surgical Hx:  Past Surgical History:   Procedure Laterality Date   • NO PAST SURGERIES         Current Meds:    Current Outpatient Medications:   •  glucose blood (Accu-Chek Maria L) test strip, Used to check blood sugar up to 4 times daily, Disp: 200 each, Rfl: 11  •  Insulin Degludec (TRESIBA FLEXTOUCH) 200 UNIT/ML solution pen-injector pen injection, Inject 30 units under the skin every night at bedtime, Disp: 2 pen, Rfl: 11  •  Insulin Lispro (ADMELOG SOLOSTAR) 100 UNIT/ML injection pen, INJECT 25 UNITS UNDER THE SKIN THREE TIMES DAILY WITH MEALS, Disp: 15 mL, Rfl: 6  •  Insulin Pen Needle (B-D UF III MINI PEN NEEDLES) 31G X 5 MM misc, Use to inject insulin 4 times per day, Disp: 200 each, Rfl: 11  •  levETIRAcetam (KEPPRA) 500 MG tablet, Take 1 tablet by mouth Daily., Disp: 30 tablet, Rfl: 11  •  vitamin D (ERGOCALCIFEROL) 1.25 MG (00903 UT) capsule capsule, Take 1 capsule by mouth Every 7 (Seven) Days., Disp: 4 capsule, Rfl: 11    Allergies:  No Known Allergies    Family Hx:  Family History   Problem Relation Age of Onset   • Diabetes type II Father         Social History:  Social History     Socioeconomic History   • Marital status: Single     Spouse name: Not on file   • Number of children: Not on file   • Years of  education: Not on file   • Highest education level: Not on file   Tobacco Use   • Smoking status: Never Smoker   • Smokeless tobacco: Never Used   Substance and Sexual Activity   • Alcohol use: Defer   • Drug use: Defer   • Sexual activity: Defer       Review of Systems  Review of Systems    Objective:     There were no vitals taken for this visit.  Physical Exam     Assessment/Plan:     There are no diagnoses linked to this encounter.    · Rx changes: ***  · Patient Education: ***  · Compliance at present is estimated to be {good/fair/poor:72499}.   · Efforts to improve compliance (if necessary) will be directed at {compliance:24197}.    Depression screening: {[facility] PHQ9 FOLLOW UP:93233}     Follow-up:     No follow-ups on file.    Preventative:  Health Maintenance   Topic Date Due   • Pneumococcal Vaccine 0-64 (1 of 2 - PPSV23) Never done   • HEPATITIS C SCREENING  Never done   • Hepatitis B (1 of 3 - Risk 3-dose series) Never done   • TDAP/TD VACCINES (1 - Tdap) Never done   • DIABETIC FOOT EXAM  2019   • ANNUAL PHYSICAL  2019   • DIABETIC EYE EXAM  10/24/2019   • URINE MICROALBUMIN  2021   • INFLUENZA VACCINE  10/01/2021   • HEMOGLOBIN A1C  2022   • COVID-19 Vaccine  Completed     {Preventive male/female:87838}  Recommended: {Meds; immunizations:87886}  Vaccine Counseling: {[facility] Vaccine counselin}    Weight  -Class: {BMI classification:99075}  -Patient's There is no height or weight on file to calculate BMI. indicating that he is {weight categories:97484}.   {BMI Goals:12680}    Alcohol use: Alcohol use questions deferred to the physician.  Nicotine status  reports that he has never smoked. He has never used smokeless tobacco.    Goals     •  Increase exercise (pt-stated)           RISK SCORE: {Resident-RiskScore:18689}    ***

## 2021-08-31 ENCOUNTER — LAB (OUTPATIENT)
Dept: LAB | Facility: HOSPITAL | Age: 21
End: 2021-08-31

## 2021-08-31 ENCOUNTER — OFFICE VISIT (OUTPATIENT)
Dept: FAMILY MEDICINE CLINIC | Facility: CLINIC | Age: 21
End: 2021-08-31

## 2021-08-31 ENCOUNTER — TRANSCRIBE ORDERS (OUTPATIENT)
Dept: LAB | Facility: HOSPITAL | Age: 21
End: 2021-08-31

## 2021-08-31 VITALS
BODY MASS INDEX: 21.07 KG/M2 | HEIGHT: 66 IN | HEART RATE: 66 BPM | WEIGHT: 131.1 LBS | OXYGEN SATURATION: 98 % | SYSTOLIC BLOOD PRESSURE: 110 MMHG | DIASTOLIC BLOOD PRESSURE: 72 MMHG

## 2021-08-31 DIAGNOSIS — N39.0 URINARY TRACT INFECTION WITHOUT HEMATURIA, SITE UNSPECIFIED: ICD-10-CM

## 2021-08-31 DIAGNOSIS — R53.83 FATIGUE, UNSPECIFIED TYPE: Primary | ICD-10-CM

## 2021-08-31 DIAGNOSIS — E10.9 TYPE 1 DIABETES MELLITUS WITHOUT COMPLICATION (HCC): Primary | ICD-10-CM

## 2021-08-31 DIAGNOSIS — R53.83 FATIGUE, UNSPECIFIED TYPE: ICD-10-CM

## 2021-08-31 PROCEDURE — 85025 COMPLETE CBC W/AUTO DIFF WBC: CPT | Performed by: UROLOGY

## 2021-08-31 PROCEDURE — 99213 OFFICE O/P EST LOW 20 MIN: CPT | Performed by: STUDENT IN AN ORGANIZED HEALTH CARE EDUCATION/TRAINING PROGRAM

## 2021-08-31 PROCEDURE — 36415 COLL VENOUS BLD VENIPUNCTURE: CPT

## 2021-08-31 PROCEDURE — 84450 TRANSFERASE (AST) (SGOT): CPT

## 2021-08-31 PROCEDURE — 81001 URINALYSIS AUTO W/SCOPE: CPT | Performed by: UROLOGY

## 2021-08-31 RX ORDER — OXYBUTYNIN CHLORIDE 5 MG/1
TABLET, EXTENDED RELEASE ORAL
COMMUNITY
Start: 2021-08-27 | End: 2022-02-25

## 2021-08-31 RX ORDER — INSULIN LISPRO 100 [IU]/ML
INJECTION, SOLUTION INTRAVENOUS; SUBCUTANEOUS
COMMUNITY
Start: 2021-08-24 | End: 2022-01-10 | Stop reason: SDUPTHER

## 2021-08-31 NOTE — PATIENT INSTRUCTIONS
You will get a phone call for an eye appointment and for your diabetic shoes and inserts.     Call us if you need us before your return visit in 6 months.

## 2021-08-31 NOTE — PROGRESS NOTES
Family Medicine Residency  Ton Carter MD    Subjective:     Tomas Novak is a 21 y.o. male who presents for from diabetes type 1 and seizures.    Patient has had type 1 diabetes since he was 3 years old.  He endorses good control over his blood sugars up until now and is of a patient of Dr. Sukhdev Davis.  His last hemoglobin A1c was 6.19.  He is overdue for diabetic eye exam and foot exam.    Patient takes Keppra for seizures and endorses being seizure-free for many years.    The following portions of the patient's history were reviewed and updated as appropriate: allergies, current medications, past family history, past medical history, past social history, past surgical history and problem list.    Past Medical Hx:  Past Medical History:   Diagnosis Date   • Seizure disorder (CMS/[facility]) 1/5/2017   • Type 1 diabetes mellitus without complication (CMS/[facility]) 1/5/2017   • Vitamin D deficiency 1/5/2017       Past Surgical Hx:  Past Surgical History:   Procedure Laterality Date   • NO PAST SURGERIES         Current Meds:    Current Outpatient Medications:   •  Cholecalciferol 125 MCG (5000 UT) tablet, Vitamin D3 125 mcg (5,000 unit) tablet  Take by oral route., Disp: , Rfl:   •  glucose blood (Accu-Chek Maria L) test strip, Used to check blood sugar up to 4 times daily, Disp: 200 each, Rfl: 11  •  Insulin Degludec (TRESIBA FLEXTOUCH) 200 UNIT/ML solution pen-injector pen injection, Inject 30 units under the skin every night at bedtime, Disp: 2 pen, Rfl: 11  •  Insulin Lispro, 1 Unit Dial, (HUMALOG) 100 UNIT/ML solution pen-injector, ADMINISTER 25 UNITS UNDER THE SKIN THREE TIMES DAILY WITH MEALS, Disp: , Rfl:   •  Insulin Pen Needle (B-D UF III MINI PEN NEEDLES) 31G X 5 MM misc, Use to inject insulin 4 times per day, Disp: 200 each, Rfl: 11  •  levETIRAcetam (KEPPRA) 500 MG tablet, Take 1 tablet by mouth Daily., Disp: 30 tablet, Rfl: 11  •  vitamin D (ERGOCALCIFEROL) 1.25 MG (56221 UT) capsule capsule,  "Take 1 capsule by mouth Every 7 (Seven) Days., Disp: 4 capsule, Rfl: 11  •  oxybutynin XL (DITROPAN-XL) 5 MG 24 hr tablet, , Disp: , Rfl:     Allergies:  No Known Allergies    Family Hx:  Family History   Problem Relation Age of Onset   • Diabetes type II Father         Social History:  Social History     Socioeconomic History   • Marital status: Single     Spouse name: Not on file   • Number of children: Not on file   • Years of education: Not on file   • Highest education level: Not on file   Tobacco Use   • Smoking status: Never Smoker   • Smokeless tobacco: Never Used   Substance and Sexual Activity   • Alcohol use: Defer   • Drug use: Defer   • Sexual activity: Defer       Review of Systems  Review of Systems   Constitutional: Negative for activity change and appetite change.   HENT: Negative for congestion and ear pain.    Eyes: Negative for pain and discharge.   Respiratory: Negative for chest tightness and shortness of breath.    Cardiovascular: Negative for chest pain and palpitations.   Gastrointestinal: Negative for abdominal distention and abdominal pain.   Endocrine: Negative for cold intolerance and heat intolerance.   Genitourinary: Negative for difficulty urinating and dysuria.   Musculoskeletal: Negative for arthralgias and back pain.   Skin: Negative for color change and rash.   Allergic/Immunologic: Negative for environmental allergies and food allergies.   Neurological: Negative for dizziness and headaches.   Hematological: Negative for adenopathy. Does not bruise/bleed easily.   Psychiatric/Behavioral: Negative for agitation and confusion.       Objective:     /72   Pulse 66   Ht 167.6 cm (66\")   Wt 59.5 kg (131 lb 1.6 oz)   SpO2 98%   BMI 21.16 kg/m²   Physical Exam  Vitals and nursing note reviewed.   Constitutional:       Appearance: Normal appearance. He is well-developed.   HENT:      Head: Normocephalic and atraumatic.   Eyes:      Pupils: Pupils are equal, round, and reactive " to light.   Neck:      Thyroid: No thyromegaly.      Trachea: No tracheal deviation.   Cardiovascular:      Rate and Rhythm: Normal rate.      Pulses:           Radial pulses are 2+ on the left side.        Dorsalis pedis pulses are 2+ on the right side and 2+ on the left side.      Heart sounds: Normal heart sounds, S1 normal and S2 normal.   Pulmonary:      Effort: Pulmonary effort is normal.      Breath sounds: Normal breath sounds.   Abdominal:      Palpations: Abdomen is soft.   Musculoskeletal:         General: Normal range of motion.      Cervical back: Neck supple.        Feet:    Feet:      Right foot:      Protective Sensation: 7 sites tested. 7 sites sensed.      Skin integrity: Callus and dry skin present.      Left foot:      Protective Sensation: 7 sites tested. 7 sites sensed.      Skin integrity: Callus and dry skin present.   Skin:     General: Skin is warm and dry.      Capillary Refill: Capillary refill takes 2 to 3 seconds.   Neurological:      Mental Status: He is alert and oriented to person, place, and time.      GCS: GCS eye subscore is 4. GCS verbal subscore is 5. GCS motor subscore is 6.   Psychiatric:         Speech: Speech normal.         Behavior: Behavior normal.         Thought Content: Thought content normal.          Assessment/Plan:     Diagnoses and all orders for this visit:    1. Type 1 diabetes mellitus without complication (CMS/[facility]) (Primary)  -     Ambulatory Referral for Diabetic Eye Exam-Ophthalmology  -     Ambulatory Referral to Social Work      Diabetic foot exam performed with multiple sites of dry skin and calluses.  Referral to social work placed for diabetic shoes and inserts.  Referral for diabetic eye exam placed.  · Rx changes: None  · Patient Education: See above  · Compliance at present is estimated to be good.   · Efforts to improve compliance (if necessary) will be directed at Medication compliance and proper follow-ups.    Depression screening: Patient  screened positive for depression based on a PHQ-9 score of 0 on 9/30/2020. Follow-up recommendations include: Follow-up as needed.     Follow-up:     Return in about 6 months (around 2/28/2022) for Recheck dmI.    Preventative:  Health Maintenance   Topic Date Due   • Pneumococcal Vaccine 0-64 (1 of 2 - PPSV23) Never done   • HEPATITIS C SCREENING  Never done   • Hepatitis B (1 of 3 - Risk 3-dose series) Never done   • TDAP/TD VACCINES (1 - Tdap) Never done   • ANNUAL PHYSICAL  07/06/2019   • DIABETIC EYE EXAM  10/24/2019   • URINE MICROALBUMIN  06/30/2021   • INFLUENZA VACCINE  10/01/2021   • HEMOGLOBIN A1C  01/01/2022   • DIABETIC FOOT EXAM  08/31/2022   • COVID-19 Vaccine  Completed     Male Preventative: Exercises regularly  Recommended: none  Vaccine Counseling: N/A    Weight  -Class: Normal: 18.5-24.9kg/m2  -Patient's Body mass index is 21.16 kg/m². indicating that he is within normal range (BMI 18.5-24.9). No BMI management plan needed..   have 3 meals a day    Alcohol use: Alcohol use questions deferred to the physician.  Nicotine status  reports that he has never smoked. He has never used smokeless tobacco.    Goals     •  Increase exercise (pt-stated)           RISK SCORE: 4      This document has been electronically signed by Ton Carter MD on August 31, 2021 15:38 CDT    Ton Carter MD PGY-3  Part of this note may be an electronic transcription/translation of spoken language to printed text using the Dragon Dictation System.

## 2021-09-01 LAB
AST SERPL-CCNC: 17 U/L (ref 1–40)
BACTERIA UR QL AUTO: ABNORMAL /HPF
BASOPHILS # BLD AUTO: 0.04 10*3/MM3 (ref 0–0.2)
BASOPHILS NFR BLD AUTO: 0.8 % (ref 0–1.5)
BILIRUB UR QL STRIP: NEGATIVE
CLARITY UR: CLEAR
COLOR UR: YELLOW
DEPRECATED RDW RBC AUTO: 41.7 FL (ref 37–54)
EOSINOPHIL # BLD AUTO: 0.1 10*3/MM3 (ref 0–0.4)
EOSINOPHIL NFR BLD AUTO: 2 % (ref 0.3–6.2)
ERYTHROCYTE [DISTWIDTH] IN BLOOD BY AUTOMATED COUNT: 12.8 % (ref 12.3–15.4)
GLUCOSE UR STRIP-MCNC: NEGATIVE MG/DL
HCT VFR BLD AUTO: 49.2 % (ref 37.5–51)
HGB BLD-MCNC: 15.5 G/DL (ref 13–17.7)
HGB UR QL STRIP.AUTO: NEGATIVE
IMM GRANULOCYTES # BLD AUTO: 0.01 10*3/MM3 (ref 0–0.05)
IMM GRANULOCYTES NFR BLD AUTO: 0.2 % (ref 0–0.5)
KETONES UR QL STRIP: NEGATIVE
LEUKOCYTE ESTERASE UR QL STRIP.AUTO: ABNORMAL
LYMPHOCYTES # BLD AUTO: 1.16 10*3/MM3 (ref 0.7–3.1)
LYMPHOCYTES NFR BLD AUTO: 23.7 % (ref 19.6–45.3)
MCH RBC QN AUTO: 27.7 PG (ref 26.6–33)
MCHC RBC AUTO-ENTMCNC: 31.5 G/DL (ref 31.5–35.7)
MCV RBC AUTO: 88 FL (ref 79–97)
MONOCYTES # BLD AUTO: 0.46 10*3/MM3 (ref 0.1–0.9)
MONOCYTES NFR BLD AUTO: 9.4 % (ref 5–12)
NEUTROPHILS NFR BLD AUTO: 3.12 10*3/MM3 (ref 1.7–7)
NEUTROPHILS NFR BLD AUTO: 63.9 % (ref 42.7–76)
NITRITE UR QL STRIP: NEGATIVE
NRBC BLD AUTO-RTO: 0 /100 WBC (ref 0–0.2)
PH UR STRIP.AUTO: 8.5 [PH] (ref 5–8)
PLATELET # BLD AUTO: 217 10*3/MM3 (ref 140–450)
PMV BLD AUTO: 11.9 FL (ref 6–12)
PROT UR QL STRIP: ABNORMAL
RBC # BLD AUTO: 5.59 10*6/MM3 (ref 4.14–5.8)
RBC # UR: ABNORMAL /HPF
REF LAB TEST METHOD: ABNORMAL
SP GR UR STRIP: 1.03 (ref 1–1.03)
SQUAMOUS #/AREA URNS HPF: ABNORMAL /HPF
UROBILINOGEN UR QL STRIP: ABNORMAL
WBC # BLD AUTO: 4.89 10*3/MM3 (ref 3.4–10.8)
WBC UR QL AUTO: ABNORMAL /HPF

## 2021-09-07 ENCOUNTER — TELEPHONE (OUTPATIENT)
Dept: FAMILY MEDICINE CLINIC | Facility: CLINIC | Age: 21
End: 2021-09-07

## 2021-09-07 NOTE — TELEPHONE ENCOUNTER
Spoke with pt re: DM shoes.  Advised [facility] accepts his insurance.  Pt agreed to referral to [facility].      Paperwork completed by PCP and LCSW faxed referral.                  This document has been electronically signed by Krystyna Askew LCSW on September 7, 2021 14:53 CDT

## 2021-09-09 ENCOUNTER — TELEPHONE (OUTPATIENT)
Dept: FAMILY MEDICINE CLINIC | Facility: CLINIC | Age: 21
End: 2021-09-09

## 2021-09-09 NOTE — TELEPHONE ENCOUNTER
Antibiotics are no longer recommended for routine dental procedures with knee replacement.   
Spoke with Kristin at [facility].  Confirmed that they rec'd referral and already spoke with pt and are working on getting him his shoes.                  This document has been electronically signed by Krystyna Askew LCSW on September 9, 2021 10:09 CDT    
93148 Exp Problem Focused - Mod. Complex

## 2021-10-29 ENCOUNTER — TELEPHONE (OUTPATIENT)
Dept: ENDOCRINOLOGY | Facility: CLINIC | Age: 21
End: 2021-10-29

## 2021-11-03 ENCOUNTER — DOCUMENTATION (OUTPATIENT)
Dept: ENDOCRINOLOGY | Facility: CLINIC | Age: 21
End: 2021-11-03

## 2021-12-15 ENCOUNTER — TELEPHONE (OUTPATIENT)
Dept: ENDOCRINOLOGY | Facility: CLINIC | Age: 21
End: 2021-12-15

## 2021-12-15 NOTE — TELEPHONE ENCOUNTER
[employer] called needing to speak to someone about changing the   Insulin Degludec (TRESIBA FLEXTOUCH) 200 UNIT/ML solution pen-injector pen injection  To the brand name. They said the insurance requires him to be on the brand name by December 31st 2021 or it will require a PA.    They said to just tell the pharmacy to switch to the name brand by December 31st 2021.     Please Advise      Thanks

## 2021-12-22 DIAGNOSIS — E10.9 TYPE 1 DIABETES MELLITUS WITHOUT COMPLICATION (HCC): ICD-10-CM

## 2022-01-10 RX ORDER — INSULIN LISPRO 100 [IU]/ML
INJECTION, SOLUTION INTRAVENOUS; SUBCUTANEOUS
Qty: 3 ML | Refills: 3 | Status: SHIPPED | OUTPATIENT
Start: 2022-01-10 | End: 2022-01-13 | Stop reason: SDUPTHER

## 2022-01-10 NOTE — TELEPHONE ENCOUNTER
Incoming Refill Request      Medication requested (name and dose): HUMALOG 100 UNIT     Pharmacy where request should be sent: [facility]    Additional details provided by patient:     Best call back number: [phone]    Does the patient have less than a 3 day supply:  [x] Yes  [] No    Hermilo Flores Rep  01/10/22, 10:39 CST

## 2022-01-11 ENCOUNTER — TELEPHONE (OUTPATIENT)
Dept: ENDOCRINOLOGY | Facility: CLINIC | Age: 22
End: 2022-01-11

## 2022-01-11 NOTE — TELEPHONE ENCOUNTER
Pharmacy only gave pt one insulin pen when he went to pick it up after calling to refill.    He is needing more than that.

## 2022-01-13 RX ORDER — INSULIN LISPRO 100 [IU]/ML
INJECTION, SOLUTION INTRAVENOUS; SUBCUTANEOUS
Qty: 5 PEN | Refills: 3 | Status: SHIPPED | OUTPATIENT
Start: 2022-01-13 | End: 2022-03-31 | Stop reason: SDUPTHER

## 2022-02-25 ENCOUNTER — LAB (OUTPATIENT)
Dept: LAB | Facility: HOSPITAL | Age: 22
End: 2022-02-25

## 2022-02-25 ENCOUNTER — OFFICE VISIT (OUTPATIENT)
Dept: FAMILY MEDICINE CLINIC | Facility: CLINIC | Age: 22
End: 2022-02-25

## 2022-02-25 VITALS
TEMPERATURE: 97.7 F | HEIGHT: 66 IN | BODY MASS INDEX: 21.53 KG/M2 | OXYGEN SATURATION: 97 % | HEART RATE: 100 BPM | SYSTOLIC BLOOD PRESSURE: 96 MMHG | DIASTOLIC BLOOD PRESSURE: 68 MMHG | WEIGHT: 134 LBS

## 2022-02-25 DIAGNOSIS — Z11.59 ENCOUNTER FOR HEPATITIS C SCREENING TEST FOR LOW RISK PATIENT: ICD-10-CM

## 2022-02-25 DIAGNOSIS — E10.9 TYPE 1 DIABETES MELLITUS WITHOUT COMPLICATION: ICD-10-CM

## 2022-02-25 DIAGNOSIS — Z00.00 ENCOUNTER FOR ANNUAL PHYSICAL EXAM: Primary | ICD-10-CM

## 2022-02-25 LAB
ALBUMIN SERPL-MCNC: 4.6 G/DL (ref 3.5–5.2)
ALBUMIN UR-MCNC: 5.4 MG/DL
ALBUMIN/GLOB SERPL: 1.8 G/DL
ALP SERPL-CCNC: 115 U/L (ref 39–117)
ALT SERPL W P-5'-P-CCNC: 15 U/L (ref 1–41)
ANION GAP SERPL CALCULATED.3IONS-SCNC: 9.2 MMOL/L (ref 5–15)
AST SERPL-CCNC: 15 U/L (ref 1–40)
BILIRUB SERPL-MCNC: 0.4 MG/DL (ref 0–1.2)
BUN SERPL-MCNC: 12 MG/DL (ref 6–20)
BUN/CREAT SERPL: 17.6 (ref 7–25)
CALCIUM SPEC-SCNC: 9.6 MG/DL (ref 8.6–10.5)
CHLORIDE SERPL-SCNC: 102 MMOL/L (ref 98–107)
CO2 SERPL-SCNC: 27.8 MMOL/L (ref 22–29)
CREAT SERPL-MCNC: 0.68 MG/DL (ref 0.76–1.27)
CREAT UR-MCNC: 121.9 MG/DL
GFR SERPL CREATININE-BSD FRML MDRD: 145 ML/MIN/1.73
GLOBULIN UR ELPH-MCNC: 2.5 GM/DL
GLUCOSE SERPL-MCNC: 226 MG/DL (ref 65–99)
HBA1C MFR BLD: 5.7 % (ref 4.8–5.6)
HCV AB SER DONR QL: NORMAL
MICROALBUMIN/CREAT UR: 44.3 MG/G
POTASSIUM SERPL-SCNC: 4.8 MMOL/L (ref 3.5–5.2)
PROT SERPL-MCNC: 7.1 G/DL (ref 6–8.5)
SODIUM SERPL-SCNC: 139 MMOL/L (ref 136–145)

## 2022-02-25 PROCEDURE — 86803 HEPATITIS C AB TEST: CPT | Performed by: STUDENT IN AN ORGANIZED HEALTH CARE EDUCATION/TRAINING PROGRAM

## 2022-02-25 PROCEDURE — 2014F MENTAL STATUS ASSESS: CPT | Performed by: STUDENT IN AN ORGANIZED HEALTH CARE EDUCATION/TRAINING PROGRAM

## 2022-02-25 PROCEDURE — 3008F BODY MASS INDEX DOCD: CPT | Performed by: STUDENT IN AN ORGANIZED HEALTH CARE EDUCATION/TRAINING PROGRAM

## 2022-02-25 PROCEDURE — 99395 PREV VISIT EST AGE 18-39: CPT | Performed by: STUDENT IN AN ORGANIZED HEALTH CARE EDUCATION/TRAINING PROGRAM

## 2022-02-25 PROCEDURE — 36415 COLL VENOUS BLD VENIPUNCTURE: CPT | Performed by: STUDENT IN AN ORGANIZED HEALTH CARE EDUCATION/TRAINING PROGRAM

## 2022-02-25 PROCEDURE — 83036 HEMOGLOBIN GLYCOSYLATED A1C: CPT | Performed by: STUDENT IN AN ORGANIZED HEALTH CARE EDUCATION/TRAINING PROGRAM

## 2022-02-25 PROCEDURE — 82043 UR ALBUMIN QUANTITATIVE: CPT | Performed by: STUDENT IN AN ORGANIZED HEALTH CARE EDUCATION/TRAINING PROGRAM

## 2022-02-25 PROCEDURE — 82570 ASSAY OF URINE CREATININE: CPT | Performed by: STUDENT IN AN ORGANIZED HEALTH CARE EDUCATION/TRAINING PROGRAM

## 2022-02-25 PROCEDURE — 80053 COMPREHEN METABOLIC PANEL: CPT | Performed by: STUDENT IN AN ORGANIZED HEALTH CARE EDUCATION/TRAINING PROGRAM

## 2022-02-25 NOTE — PROGRESS NOTES
Family Medicine Residency  Marcia Laureano MD    Subjective:     Tomas Novak is a 21 y.o. male with concurrent medical history of Type 1 DM and seizure disorder who presents for diabetes follow up and annual physical exam.  Patient was last seen on 8/31/21 by Dr. Carter and his sugar levels were stable. Currently diabetes is being managed by Dr. Santizo. Patient is on Keppra and has been seizure free. Patient recently saw opthalmology and was negative for any retinopathy. Patient denies any nephropathy or neuropathy. However does see Dr. Boo for urinary issues which are currently stable. No new complaints today and reports being compliant with his medications.     The following portions of the patient's history were reviewed and updated as appropriate: allergies, current medications, past family history, past medical history, past social history, past surgical history and problem list.    Past Medical Hx:  Past Medical History:   Diagnosis Date   • Seizure disorder (HCC) 1/5/2017   • Type 1 diabetes mellitus without complication (HCC) 1/5/2017   • Vitamin D deficiency 1/5/2017       Past Surgical Hx:  Past Surgical History:   Procedure Laterality Date   • NO PAST SURGERIES         Current Meds:    Current Outpatient Medications:   •  Cholecalciferol 125 MCG (5000 UT) tablet, Vitamin D3 125 mcg (5,000 unit) tablet  Take by oral route., Disp: , Rfl:   •  glucose blood (Accu-Chek Maria L) test strip, Used to check blood sugar up to 4 times daily, Disp: 200 each, Rfl: 11  •  Insulin Degludec (TRESIBA FLEXTOUCH) 200 UNIT/ML solution pen-injector pen injection, Inject 30 units under the skin every night at bedtime, Disp: 2 pen, Rfl: 11  •  Insulin Lispro, 1 Unit Dial, (HUMALOG) 100 UNIT/ML solution pen-injector, Inject 25units subq three times daily with meals, Disp: 5 pen, Rfl: 3  •  Insulin Pen Needle (B-D UF III MINI PEN NEEDLES) 31G X 5 MM misc, Use to inject insulin 4 times per day, Disp: 200 each, Rfl:  "11  •  levETIRAcetam (KEPPRA) 500 MG tablet, Take 1 tablet by mouth Daily., Disp: 30 tablet, Rfl: 11  •  vitamin D (ERGOCALCIFEROL) 1.25 MG (38303 UT) capsule capsule, Take 1 capsule by mouth Every 7 (Seven) Days., Disp: 4 capsule, Rfl: 11    Allergies:  No Known Allergies    Family Hx:  Family History   Problem Relation Age of Onset   • Diabetes type II Father         Social History:  Social History     Socioeconomic History   • Marital status: Single   Tobacco Use   • Smoking status: Never Smoker   • Smokeless tobacco: Never Used   Vaping Use   • Vaping Use: Never used   Substance and Sexual Activity   • Alcohol use: Defer   • Drug use: Defer   • Sexual activity: Defer       Review of Systems  Review of Systems   Constitutional: Negative for activity change, appetite change, diaphoresis, fatigue and fever.   Respiratory: Negative for cough, chest tightness, shortness of breath and wheezing.    Cardiovascular: Negative for chest pain, palpitations and leg swelling.   Gastrointestinal: Negative for abdominal pain, diarrhea, nausea and vomiting.   Endocrine: Negative for polydipsia, polyphagia and polyuria.   Genitourinary: Positive for difficulty urinating. Negative for dysuria, flank pain, frequency, hematuria and urgency.   Musculoskeletal: Negative for arthralgias and myalgias.   Skin: Negative for color change and rash.   Neurological: Negative for dizziness, weakness, light-headedness and headaches.   Psychiatric/Behavioral: Negative for confusion, decreased concentration and sleep disturbance. The patient is not nervous/anxious.        Objective:     BP 96/68   Pulse 100   Temp 97.7 °F (36.5 °C)   Ht 167.6 cm (66\")   Wt 60.8 kg (134 lb)   SpO2 97%   BMI 21.63 kg/m²   Physical Exam  Vitals and nursing note reviewed.   Constitutional:       Appearance: Normal appearance. He is normal weight. He is not ill-appearing.   HENT:      Head: Normocephalic and atraumatic.      Mouth/Throat:      Mouth: Mucous " membranes are moist.      Pharynx: Oropharynx is clear.   Eyes:      Extraocular Movements: Extraocular movements intact.      Conjunctiva/sclera: Conjunctivae normal.      Pupils: Pupils are equal, round, and reactive to light.   Cardiovascular:      Rate and Rhythm: Normal rate and regular rhythm.      Pulses: Normal pulses.      Heart sounds: Normal heart sounds. No murmur heard.      Pulmonary:      Effort: Pulmonary effort is normal.      Breath sounds: Normal breath sounds.   Abdominal:      General: Bowel sounds are normal.      Palpations: Abdomen is soft.   Musculoskeletal:      Cervical back: Normal range of motion and neck supple.      Right lower leg: No edema.      Left lower leg: No edema.   Skin:     General: Skin is warm and dry.      Capillary Refill: Capillary refill takes less than 2 seconds.   Neurological:      General: No focal deficit present.      Mental Status: He is alert and oriented to person, place, and time.   Psychiatric:         Mood and Affect: Mood normal.         Behavior: Behavior normal.          Assessment/Plan:     Diagnoses and all orders for this visit:    1. Encounter for annual physical exam (Primary)    2. Type 1 diabetes mellitus without complication (HCC)  -     Comprehensive metabolic panel  -     Hemoglobin A1c  -     Microalbumin / Creatinine Urine Ratio - Urine, Clean Catch    3. Encounter for hepatitis C screening test for low risk patient  -     Hepatitis C antibody      Patient currently stable on current medication regimen. Encouraged routine follow up with specialists and continue current medication regimen. Will do routine blood work today and ensure good kidney function in the setting of type 1 DM.     · Rx changes: None  · Patient Education: low carb and sugar diet   · Compliance at present is estimated to be good.   · Efforts to improve compliance (if necessary) will be directed at regular blood sugar monitoring: 3 times daily.    Depression screening: Up to  date; last screen 9/30/2020     Follow-up:     Return in about 6 months (around 8/25/2022), or if symptoms worsen or fail to improve, for Recheck.    Preventative:  Health Maintenance   Topic Date Due   • Pneumococcal Vaccine 0-64 (1 of 2 - PPSV23) Never done   • Hepatitis B (1 of 3 - Risk 3-dose series) Never done   • TDAP/TD VACCINES (1 - Tdap) Never done   • INFLUENZA VACCINE  08/01/2021   • COVID-19 Vaccine (3 - Booster for Pfizer series) 09/19/2021   • HEMOGLOBIN A1C  08/25/2022   • DIABETIC FOOT EXAM  08/31/2022   • DIABETIC EYE EXAM  12/30/2022   • URINE MICROALBUMIN  02/25/2023   • ANNUAL PHYSICAL  02/26/2023   • HEPATITIS C SCREENING  Completed     Recommended: Influenza and COVID 19 booster shot   Vaccine Counseling: N/A    Weight  -Class: Normal: 18.5-24.9kg/m2  -Patient's Body mass index is 21.63 kg/m². indicating that he is within normal range (BMI 18.5-24.9). No BMI management plan needed..   eat more fruits and vegetables, decrease soda or juice intake, reduce screen time, reduce fast food intake and plan meals    Alcohol use: Alcohol use questions deferred to the physician.  Nicotine status  reports that he has never smoked. He has never used smokeless tobacco.    Goals     •  Increase exercise (pt-stated)           RISK SCORE: 3        Marcia Laureano MD PGY-2  [facility] Family Medicine Residency   This document has been electronically signed by Marcia Laureano MD on February 28, 2022 09:57 CST

## 2022-03-04 ENCOUNTER — TELEPHONE (OUTPATIENT)
Dept: FAMILY MEDICINE CLINIC | Facility: CLINIC | Age: 22
End: 2022-03-04

## 2022-03-14 ENCOUNTER — DOCUMENTATION (OUTPATIENT)
Dept: ENDOCRINOLOGY | Facility: CLINIC | Age: 22
End: 2022-03-14

## 2022-03-15 ENCOUNTER — DOCUMENTATION (OUTPATIENT)
Dept: ENDOCRINOLOGY | Facility: CLINIC | Age: 22
End: 2022-03-15

## 2022-03-31 ENCOUNTER — TELEPHONE (OUTPATIENT)
Dept: ENDOCRINOLOGY | Facility: CLINIC | Age: 22
End: 2022-03-31

## 2022-03-31 DIAGNOSIS — E10.9 TYPE 1 DIABETES MELLITUS WITHOUT COMPLICATION: Primary | ICD-10-CM

## 2022-03-31 RX ORDER — INSULIN LISPRO 100 [IU]/ML
INJECTION, SOLUTION INTRAVENOUS; SUBCUTANEOUS
Qty: 5 PEN | Refills: 3 | Status: SHIPPED | OUTPATIENT
Start: 2022-03-31 | End: 2022-07-27

## 2022-04-07 RX ORDER — ERGOCALCIFEROL 1.25 MG/1
CAPSULE ORAL
Qty: 4 CAPSULE | Refills: 11 | Status: SHIPPED | OUTPATIENT
Start: 2022-04-07 | End: 2022-09-19 | Stop reason: SDUPTHER

## 2022-04-29 ENCOUNTER — TELEMEDICINE (OUTPATIENT)
Dept: ENDOCRINOLOGY | Facility: CLINIC | Age: 22
End: 2022-04-29

## 2022-04-29 DIAGNOSIS — G40.909 SEIZURE DISORDER: ICD-10-CM

## 2022-04-29 DIAGNOSIS — E10.9 TYPE 1 DIABETES MELLITUS WITHOUT COMPLICATION: Primary | ICD-10-CM

## 2022-04-29 DIAGNOSIS — E55.9 VITAMIN D DEFICIENCY: ICD-10-CM

## 2022-04-29 PROCEDURE — 99214 OFFICE O/P EST MOD 30 MIN: CPT | Performed by: INTERNAL MEDICINE

## 2022-04-29 NOTE — PROGRESS NOTES
" Tomas Novak is a 21 y.o. male who presents for  evaluation of type 1 diabetes                                      This was a Telehealth Encounter. Benefits and Disadvantages of a Telehealth Visit were discussed and accepted by patient. .  Patient agreed to receive service through Telehealth visit as patient is being compliant with social distancing recommendations imparted by CDC.     You have chosen to receive care through a telehealth visit.  Do you consent to use a video/audio connection for your medical care today? Yes                                    Primary Care / Referring Provider  Marcia Laureano MD      Duration since 3 years old    Timing - Diabetes is Constant    Quality - Aic at goal      Severity -  moderate    Complications - none    Current symptoms/problems  Urgency and frequency w improvement     Alleviating Factors: Compliance      Side Effects  none    Current diet  in general, a \"healthy\" diet      Current exercise exercise    Current monitoring regimen: home blood tests - using dexcom        Objective:     There were no vitals taken for this visit.  AOx3  No goiter , no bruit  Skin acne  RRR  CTA  Abdomen , soft NT  Ext no edema  Nl pulses     Lab Review      Component      Latest Ref Rng & Units 10/20/2017   C-Peptide      1.1 - 4.4 ng/mL <0.1 (L)         Assessment/Plan       ICD-10-CM ICD-9-CM   1. Type 1 diabetes mellitus without complication (HCC)  E10.9 250.01   2. Seizure disorder (HCC)  G40.909 345.90   3. Vitamin D deficiency  E55.9 268.9       Glycemic Management:   Lab Results   Component Value Date    HGBA1C 5.70 (H) 02/25/2022    HGBA1C 6.19 (H) 07/01/2021    HGBA1C 6.20 (H) 12/29/2020     Lab Results   Component Value Date    GLUCOSE 226 (H) 02/25/2022    BUN 12 02/25/2022    CREATININE 0.68 (L) 02/25/2022    EGFRIFNONA 145 02/25/2022    BCR 17.6 02/25/2022    CO2 27.8 02/25/2022    CALCIUM 9.6 02/25/2022    ALBUMIN 4.60 02/25/2022    AST 15 02/25/2022    ALT 15 " 02/25/2022     Lab Results   Component Value Date    WBC 4.89 08/31/2021    HGB 15.5 08/31/2021    HCT 49.2 08/31/2021    MCV 88.0 08/31/2021     08/31/2021     Humalog    1 unit per 5 grams     1 to 12.5 correction ( 8 /100)    --      Tresiba 20 units      We will keep these doses, they are working well for him     Lipid Management  Lab Results   Component Value Date    CHOL 114 12/29/2020    CHOL 108 06/30/2020    CHOL 128 10/02/2019     Lab Results   Component Value Date    TRIG 58 12/29/2020    TRIG 109 06/30/2020    TRIG 44 10/02/2019     Lab Results   Component Value Date    HDL 37 (L) 12/29/2020    HDL 36 (L) 06/30/2020    HDL 44 10/02/2019     No components found for: LDLCALC  Lab Results   Component Value Date    LDL 64 12/29/2020    LDL 50 06/30/2020    LDL 75 10/02/2019           Blood Pressure Management:    There were no vitals filed for this visit.    Lab Results   Component Value Date    GLUCOSE 226 (H) 02/25/2022    BUN 12 02/25/2022    CREATININE 0.68 (L) 02/25/2022    EGFRIFNONA 145 02/25/2022    BCR 17.6 02/25/2022    CO2 27.8 02/25/2022    CALCIUM 9.6 02/25/2022    ALBUMIN 4.60 02/25/2022    AST 15 02/25/2022    ALT 15 02/25/2022       Urinary Retention  Issues emptying bladder, I consider has interstitial cystitis       Refer to urologist     Microvascular Complication Monitoring:      No complications     Immunizations:      No flu shot     Preventive Care:      Nonsmoking     Weight Related:   Wt Readings from Last 3 Encounters:   02/25/22 60.8 kg (134 lb)   08/31/21 59.5 kg (131 lb 1.6 oz)   07/01/21 58.1 kg (128 lb)     There is no height or weight on file to calculate BMI.        Bone Health    Lab Results   Component Value Date    CALCIUM 9.6 02/25/2022       Lab Results   Component Value Date    GOMN44ZE 54.1 12/29/2020    PKSW84OO 30.7 06/30/2020    IDZU28EY 64.4 10/02/2019          on seizure meds.     vit D 50 th units weekly       Thyroid Health  Lab Results   Component Value  Date    TSH 1.640 12/29/2020           Other Diabetes Related Aspects       Lab Results   Component Value Date    MFHEGHVE90 1,694 (H) 12/29/2020           Seizure disorder     Refilled Keppra         No orders of the defined types were placed in this encounter.    No follow-ups on file.        This document has been electronically signed by Lenin Davis MD on April 29, 2022 17:14 CDT     Lenin Davis MD        This document has been electronically signed by Lenin Davis MD on August 10, 2022 21:53 CDT          A copy of my note was sent to Marcia Laureano MD    Please see my above opinion and suggestions.

## 2022-05-02 ENCOUNTER — TELEPHONE (OUTPATIENT)
Dept: ENDOCRINOLOGY | Facility: CLINIC | Age: 22
End: 2022-05-02

## 2022-05-02 DIAGNOSIS — G40.909 SEIZURE DISORDER: ICD-10-CM

## 2022-05-02 RX ORDER — LEVETIRACETAM 500 MG/1
500 TABLET ORAL DAILY
Qty: 30 TABLET | Refills: 11 | Status: SHIPPED | OUTPATIENT
Start: 2022-05-02 | End: 2022-09-19 | Stop reason: SDUPTHER

## 2022-05-02 NOTE — TELEPHONE ENCOUNTER
Pt needs levETIRAcetam (KEPPRA) 500 MG tablet sent to [facility] on [address] [facility]. Thank you

## 2022-07-26 DIAGNOSIS — E10.9 TYPE 1 DIABETES MELLITUS WITHOUT COMPLICATION: ICD-10-CM

## 2022-07-27 RX ORDER — INSULIN LISPRO 100 [IU]/ML
INJECTION, SOLUTION INTRAVENOUS; SUBCUTANEOUS
Qty: 15 ML | Refills: 6 | Status: SHIPPED | OUTPATIENT
Start: 2022-07-27 | End: 2022-09-19

## 2022-08-11 ENCOUNTER — DOCUMENTATION (OUTPATIENT)
Dept: ENDOCRINOLOGY | Facility: CLINIC | Age: 22
End: 2022-08-11

## 2022-08-11 NOTE — PROGRESS NOTES
K[facility] FOR [facility] @ [phone]    CONFIRMATION RECEIVED  SENT TO MED REC

## 2022-08-19 ENCOUNTER — DOCUMENTATION (OUTPATIENT)
Dept: ENDOCRINOLOGY | Facility: CLINIC | Age: 22
End: 2022-08-19

## 2022-08-26 ENCOUNTER — TELEPHONE (OUTPATIENT)
Dept: ENDOCRINOLOGY | Facility: CLINIC | Age: 22
End: 2022-08-26

## 2022-08-26 RX ORDER — INSULIN LISPRO 100 [IU]/ML
25 INJECTION, SOLUTION INTRAVENOUS; SUBCUTANEOUS
Qty: 5 PEN | Refills: 11 | Status: SHIPPED | OUTPATIENT
Start: 2022-08-26 | End: 2022-09-19 | Stop reason: SDUPTHER

## 2022-08-26 NOTE — TELEPHONE ENCOUNTER
There seems to be a discrepancy between what the pharmacy is telling me and telling the patient so I have sent a new script to [facility] for Insulin Lispro 25units tid. 5pens and 11 refills.

## 2022-09-16 ENCOUNTER — TELEPHONE (OUTPATIENT)
Dept: ENDOCRINOLOGY | Facility: CLINIC | Age: 22
End: 2022-09-16

## 2022-09-16 NOTE — TELEPHONE ENCOUNTER
Patient called and scheduled a follow up with Dr. Davis for 9/19/2022, he wants to know if he needs labs.    Please Advise    Thanks

## 2022-09-19 ENCOUNTER — TELEMEDICINE (OUTPATIENT)
Dept: ENDOCRINOLOGY | Facility: CLINIC | Age: 22
End: 2022-09-19

## 2022-09-19 DIAGNOSIS — E55.9 VITAMIN D DEFICIENCY: ICD-10-CM

## 2022-09-19 DIAGNOSIS — E10.9 TYPE 1 DIABETES MELLITUS WITHOUT COMPLICATION: Primary | ICD-10-CM

## 2022-09-19 DIAGNOSIS — E61.8 MINERAL DEFICIENCY: ICD-10-CM

## 2022-09-19 DIAGNOSIS — G40.909 SEIZURE DISORDER: ICD-10-CM

## 2022-09-19 DIAGNOSIS — L65.9 HAIR LOSS: ICD-10-CM

## 2022-09-19 DIAGNOSIS — E61.1 IRON DEFICIENCY: ICD-10-CM

## 2022-09-19 DIAGNOSIS — N30.10 INTERSTITIAL CYSTITIS: ICD-10-CM

## 2022-09-19 PROCEDURE — 99214 OFFICE O/P EST MOD 30 MIN: CPT | Performed by: INTERNAL MEDICINE

## 2022-09-19 RX ORDER — OXYBUTYNIN CHLORIDE 5 MG/1
5 TABLET, EXTENDED RELEASE ORAL DAILY
Qty: 30 TABLET | Refills: 11 | Status: SHIPPED | OUTPATIENT
Start: 2022-09-19 | End: 2023-02-08

## 2022-09-19 RX ORDER — INSULIN LISPRO 100 [IU]/ML
25 INJECTION, SOLUTION INTRAVENOUS; SUBCUTANEOUS
Qty: 27 ML | Refills: 11 | Status: SHIPPED | OUTPATIENT
Start: 2022-09-19

## 2022-09-19 RX ORDER — FLURBIPROFEN SODIUM 0.3 MG/ML
SOLUTION/ DROPS OPHTHALMIC
Qty: 200 EACH | Refills: 11 | Status: SHIPPED | OUTPATIENT
Start: 2022-09-19

## 2022-09-19 RX ORDER — LEVETIRACETAM 500 MG/1
500 TABLET ORAL DAILY
Qty: 30 TABLET | Refills: 11 | Status: SHIPPED | OUTPATIENT
Start: 2022-09-19

## 2022-09-19 RX ORDER — ERGOCALCIFEROL 1.25 MG/1
50000 CAPSULE ORAL
Qty: 4 CAPSULE | Refills: 11 | Status: SHIPPED | OUTPATIENT
Start: 2022-09-19

## 2022-09-19 NOTE — PROGRESS NOTES
" Tomas Novak is a 22 y.o. male who presents for  evaluation of type 1 diabetes                                      Mode of Visit: Video  Location of patient: Home  You have chosen to receive care through a telehealth visit.  Does the patient consent to use a video/audio connection for your medical care today? Yes  The visit included audio and video interaction. No technical issues occurred during this visit                                    Primary Care / Referring Provider  Marcia Laureano MD      Duration since 3 years old    Timing - Diabetes is Constant    Quality - Aic at goal      Severity -  moderate    Complications - none    Current symptoms/problems  Urgency and frequency w improvement     Alleviating Factors: Compliance      Side Effects  none    Current diet  in general, a \"healthy\" diet      Current exercise exercise    Current monitoring regimen: home blood tests - using dexcom        Objective:     There were no vitals taken for this visit.  AOx3  No goiter , no bruit  Skin acne  RRR  CTA  Abdomen , soft NT  Ext no edema  Nl pulses     Lab Review      Component      Latest Ref Rng & Units 10/20/2017   C-Peptide      1.1 - 4.4 ng/mL <0.1 (L)         Assessment & Plan       ICD-10-CM ICD-9-CM   1. Type 1 diabetes mellitus without complication (HCC)  E10.9 250.01   2. Seizure disorder (HCC)  G40.909 345.90   3. Vitamin D deficiency  E55.9 268.9   4. Interstitial cystitis  N30.10 595.1   5. Hair loss  L65.9 704.00   6. Iron deficiency  E61.1 280.9   7. Mineral deficiency  E61.8 269.3       Glycemic Management:   Lab Results   Component Value Date    HGBA1C 5.70 (H) 02/25/2022    HGBA1C 6.19 (H) 07/01/2021    HGBA1C 6.20 (H) 12/29/2020     Lab Results   Component Value Date    GLUCOSE 226 (H) 02/25/2022    BUN 12 02/25/2022    CREATININE 0.68 (L) 02/25/2022    EGFRIFNONA 145 02/25/2022    BCR 17.6 02/25/2022    CO2 27.8 02/25/2022    CALCIUM 9.6 02/25/2022    ALBUMIN 4.60 02/25/2022    AST 15 " 02/25/2022    ALT 15 02/25/2022     Lab Results   Component Value Date    WBC 4.89 08/31/2021    HGB 15.5 08/31/2021    HCT 49.2 08/31/2021    MCV 88.0 08/31/2021     08/31/2021     Humalog    1 unit per 5 grams     1 to 12.5 correction ( 8 /100)    --      Tresiba 20 units      We will keep these doses, they are working well for him     Lipid Management  Lab Results   Component Value Date    CHOL 114 12/29/2020    CHOL 108 06/30/2020    CHOL 128 10/02/2019     Lab Results   Component Value Date    TRIG 58 12/29/2020    TRIG 109 06/30/2020    TRIG 44 10/02/2019     Lab Results   Component Value Date    HDL 37 (L) 12/29/2020    HDL 36 (L) 06/30/2020    HDL 44 10/02/2019     No components found for: LDLCALC  Lab Results   Component Value Date    LDL 64 12/29/2020    LDL 50 06/30/2020    LDL 75 10/02/2019           Blood Pressure Management:    There were no vitals filed for this visit.    Lab Results   Component Value Date    GLUCOSE 226 (H) 02/25/2022    BUN 12 02/25/2022    CREATININE 0.68 (L) 02/25/2022    EGFRIFNONA 145 02/25/2022    BCR 17.6 02/25/2022    CO2 27.8 02/25/2022    CALCIUM 9.6 02/25/2022    ALBUMIN 4.60 02/25/2022    AST 15 02/25/2022    ALT 15 02/25/2022       Urinary Retention  Issues emptying bladder, I consider has interstitial cystitis       Refer to urologist     Microvascular Complication Monitoring:      No complications     Immunizations:      No flu shot     Preventive Care:      Nonsmoking     Weight Related:   Wt Readings from Last 3 Encounters:   02/25/22 60.8 kg (134 lb)   08/31/21 59.5 kg (131 lb 1.6 oz)   07/01/21 58.1 kg (128 lb)     There is no height or weight on file to calculate BMI.        Bone Health    Lab Results   Component Value Date    CALCIUM 9.6 02/25/2022       Lab Results   Component Value Date    JMQW29AV 54.1 12/29/2020    JKKC47QT 30.7 06/30/2020    IMVN25OG 64.4 10/02/2019          on seizure meds.     vit D 50 th units weekly       Thyroid Health  Lab  Results   Component Value Date    TSH 1.640 12/29/2020           Other Diabetes Related Aspects       Lab Results   Component Value Date    ZDZXNVHR67 1,694 (H) 12/29/2020           Seizure disorder     Refilled Keppra , low levels, refer to neurology   Component      Latest Ref Rng & Units 9/20/2022   Levetiracetam      10.0 - 40.0 ug/mL 2.5 (L)         Hair loss= telogen effluvium , veget diet ,. Measure below     He has low vitamin A and E   I will ask for Multivitamin but not individual replacement         Orders Placed This Encounter   Procedures   • CBC Auto Differential     Order Specific Question:   Release to patient     Answer:   Routine Release   • Comprehensive Metabolic Panel     Order Specific Question:   Release to patient     Answer:   Routine Release   • Hemoglobin A1c     Order Specific Question:   Release to patient     Answer:   Routine Release   • Lipid Panel   • Microalbumin / Creatinine Urine Ratio - Urine, Clean Catch     Order Specific Question:   Release to patient     Answer:   Routine Release   • TSH     Order Specific Question:   Release to patient     Answer:   Routine Release   • Vitamin B12     Order Specific Question:   Release to patient     Answer:   Routine Release   • Ferritin   • Iron Profile   • LEIGHANN With / DsDNA, RNP, Sjogrens A / B, Castaneda     Order Specific Question:   Release to patient     Answer:   Routine Release   • Vitamin D 25 Hydroxy     Order Specific Question:   Release to patient     Answer:   Routine Release   • Copper, Serum     Order Specific Question:   Release to patient     Answer:   Routine Release   • Vitamin A     Order Specific Question:   Release to patient     Answer:   Routine Release   • Vitamin B6     Order Specific Question:   Release to patient     Answer:   Routine Release   • Vitamin E     Order Specific Question:   Release to patient     Answer:   Routine Release   • Folate RBC     Order Specific Question:   Release to patient     Answer:    Routine Release   • Magnesium     Order Specific Question:   Release to patient     Answer:   Routine Release     No follow-ups on file.        This document has been electronically signed by Lenin Davis MD on September 19, 2022 14:18 CDT     Lenin Davis MD        This document has been electronically signed by Lenin Davis MD on September 19, 2022 14:18 CDT          A copy of my note was sent to Marcia Laureano MD    Please see my above opinion and suggestions.

## 2022-09-20 ENCOUNTER — LAB (OUTPATIENT)
Dept: LAB | Facility: HOSPITAL | Age: 22
End: 2022-09-20

## 2022-09-20 LAB
ALBUMIN SERPL-MCNC: 4.6 G/DL (ref 3.5–5.2)
ALBUMIN/GLOB SERPL: 1.5 G/DL
ALP SERPL-CCNC: 136 U/L (ref 39–117)
ALT SERPL W P-5'-P-CCNC: 15 U/L (ref 1–41)
ANION GAP SERPL CALCULATED.3IONS-SCNC: 6 MMOL/L (ref 5–15)
AST SERPL-CCNC: 20 U/L (ref 1–40)
BASOPHILS # BLD AUTO: 0.05 10*3/MM3 (ref 0–0.2)
BASOPHILS NFR BLD AUTO: 1 % (ref 0–1.5)
BILIRUB SERPL-MCNC: 0.6 MG/DL (ref 0–1.2)
BUN SERPL-MCNC: 10 MG/DL (ref 6–20)
BUN/CREAT SERPL: 14.7 (ref 7–25)
CALCIUM SPEC-SCNC: 9.4 MG/DL (ref 8.6–10.5)
CHLORIDE SERPL-SCNC: 101 MMOL/L (ref 98–107)
CHOLEST SERPL-MCNC: 120 MG/DL (ref 0–200)
CO2 SERPL-SCNC: 32 MMOL/L (ref 22–29)
CREAT SERPL-MCNC: 0.68 MG/DL (ref 0.76–1.27)
DEPRECATED RDW RBC AUTO: 39.4 FL (ref 37–54)
EGFRCR SERPLBLD CKD-EPI 2021: 133.1 ML/MIN/1.73
EOSINOPHIL # BLD AUTO: 0.1 10*3/MM3 (ref 0–0.4)
EOSINOPHIL NFR BLD AUTO: 2 % (ref 0.3–6.2)
ERYTHROCYTE [DISTWIDTH] IN BLOOD BY AUTOMATED COUNT: 12.9 % (ref 12.3–15.4)
GLOBULIN UR ELPH-MCNC: 3.1 GM/DL
GLUCOSE SERPL-MCNC: 114 MG/DL (ref 65–99)
HBA1C MFR BLD: 5.6 % (ref 4.8–5.6)
HCT VFR BLD AUTO: 46 % (ref 37.5–51)
HDLC SERPL-MCNC: 40 MG/DL (ref 40–60)
HGB BLD-MCNC: 15.6 G/DL (ref 13–17.7)
IMM GRANULOCYTES # BLD AUTO: 0.01 10*3/MM3 (ref 0–0.05)
IMM GRANULOCYTES NFR BLD AUTO: 0.2 % (ref 0–0.5)
LDLC SERPL CALC-MCNC: 65 MG/DL (ref 0–100)
LDLC/HDLC SERPL: 1.64 {RATIO}
LYMPHOCYTES # BLD AUTO: 1.4 10*3/MM3 (ref 0.7–3.1)
LYMPHOCYTES NFR BLD AUTO: 28.1 % (ref 19.6–45.3)
MCH RBC QN AUTO: 28.8 PG (ref 26.6–33)
MCHC RBC AUTO-ENTMCNC: 33.9 G/DL (ref 31.5–35.7)
MCV RBC AUTO: 85 FL (ref 79–97)
MONOCYTES # BLD AUTO: 0.41 10*3/MM3 (ref 0.1–0.9)
MONOCYTES NFR BLD AUTO: 8.2 % (ref 5–12)
NEUTROPHILS NFR BLD AUTO: 3.02 10*3/MM3 (ref 1.7–7)
NEUTROPHILS NFR BLD AUTO: 60.5 % (ref 42.7–76)
NRBC BLD AUTO-RTO: 0 /100 WBC (ref 0–0.2)
PLATELET # BLD AUTO: 327 10*3/MM3 (ref 140–450)
PMV BLD AUTO: 10.5 FL (ref 6–12)
POTASSIUM SERPL-SCNC: 3.9 MMOL/L (ref 3.5–5.2)
PROT SERPL-MCNC: 7.7 G/DL (ref 6–8.5)
RBC # BLD AUTO: 5.41 10*6/MM3 (ref 4.14–5.8)
SODIUM SERPL-SCNC: 139 MMOL/L (ref 136–145)
TRIGL SERPL-MCNC: 72 MG/DL (ref 0–150)
TSH SERPL DL<=0.05 MIU/L-ACNC: 1.49 UIU/ML (ref 0.27–4.2)
VLDLC SERPL-MCNC: 15 MG/DL (ref 5–40)
WBC NRBC COR # BLD: 4.99 10*3/MM3 (ref 3.4–10.8)

## 2022-09-20 PROCEDURE — 84590 ASSAY OF VITAMIN A: CPT | Performed by: INTERNAL MEDICINE

## 2022-09-20 PROCEDURE — 82728 ASSAY OF FERRITIN: CPT | Performed by: INTERNAL MEDICINE

## 2022-09-20 PROCEDURE — 82570 ASSAY OF URINE CREATININE: CPT | Performed by: INTERNAL MEDICINE

## 2022-09-20 PROCEDURE — 82747 ASSAY OF FOLIC ACID RBC: CPT | Performed by: INTERNAL MEDICINE

## 2022-09-20 PROCEDURE — 82525 ASSAY OF COPPER: CPT | Performed by: INTERNAL MEDICINE

## 2022-09-20 PROCEDURE — 85014 HEMATOCRIT: CPT | Performed by: INTERNAL MEDICINE

## 2022-09-20 PROCEDURE — 86038 ANTINUCLEAR ANTIBODIES: CPT | Performed by: INTERNAL MEDICINE

## 2022-09-20 PROCEDURE — 83735 ASSAY OF MAGNESIUM: CPT | Performed by: INTERNAL MEDICINE

## 2022-09-20 PROCEDURE — 85025 COMPLETE CBC W/AUTO DIFF WBC: CPT | Performed by: INTERNAL MEDICINE

## 2022-09-20 PROCEDURE — 80177 DRUG SCRN QUAN LEVETIRACETAM: CPT | Performed by: INTERNAL MEDICINE

## 2022-09-20 PROCEDURE — 84466 ASSAY OF TRANSFERRIN: CPT | Performed by: INTERNAL MEDICINE

## 2022-09-20 PROCEDURE — 84443 ASSAY THYROID STIM HORMONE: CPT | Performed by: INTERNAL MEDICINE

## 2022-09-20 PROCEDURE — 80053 COMPREHEN METABOLIC PANEL: CPT | Performed by: INTERNAL MEDICINE

## 2022-09-20 PROCEDURE — 84446 ASSAY OF VITAMIN E: CPT | Performed by: INTERNAL MEDICINE

## 2022-09-20 PROCEDURE — 80061 LIPID PANEL: CPT | Performed by: INTERNAL MEDICINE

## 2022-09-20 PROCEDURE — 83036 HEMOGLOBIN GLYCOSYLATED A1C: CPT | Performed by: INTERNAL MEDICINE

## 2022-09-20 PROCEDURE — 82043 UR ALBUMIN QUANTITATIVE: CPT | Performed by: INTERNAL MEDICINE

## 2022-09-20 PROCEDURE — 82607 VITAMIN B-12: CPT | Performed by: INTERNAL MEDICINE

## 2022-09-20 PROCEDURE — 82306 VITAMIN D 25 HYDROXY: CPT | Performed by: INTERNAL MEDICINE

## 2022-09-20 PROCEDURE — 83540 ASSAY OF IRON: CPT | Performed by: INTERNAL MEDICINE

## 2022-09-20 PROCEDURE — 84207 ASSAY OF VITAMIN B-6: CPT | Performed by: INTERNAL MEDICINE

## 2022-09-20 PROCEDURE — 36415 COLL VENOUS BLD VENIPUNCTURE: CPT | Performed by: INTERNAL MEDICINE

## 2022-09-21 LAB
25(OH)D3 SERPL-MCNC: 24.2 NG/ML (ref 30–100)
ALBUMIN UR-MCNC: <1.2 MG/DL
CREAT UR-MCNC: 77.6 MG/DL
FERRITIN SERPL-MCNC: 36.3 NG/ML (ref 30–400)
IRON 24H UR-MRATE: 94 MCG/DL (ref 59–158)
IRON SATN MFR SERPL: 25 % (ref 20–50)
MAGNESIUM SERPL-MCNC: 2.1 MG/DL (ref 1.6–2.6)
MICROALBUMIN/CREAT UR: NORMAL MG/G{CREAT}
TIBC SERPL-MCNC: 383 MCG/DL (ref 298–536)
TRANSFERRIN SERPL-MCNC: 257 MG/DL (ref 200–360)
VIT B12 BLD-MCNC: 1265 PG/ML (ref 211–946)

## 2022-09-22 LAB
ANA SER QL: NEGATIVE
FOLATE BLD-MCNC: 530 NG/ML
FOLATE RBC-MCNC: 1130 NG/ML
HCT VFR BLD AUTO: 46.9 % (ref 37.5–51)

## 2022-09-23 LAB — COPPER SERPL-MCNC: 69 UG/DL (ref 63–121)

## 2022-09-25 LAB
A-TOCOPHEROL VIT E SERPL-MCNC: 5.3 MG/L (ref 5.9–19.4)
GAMMA TOCOPHEROL SERPL-MCNC: 0.5 MG/L (ref 0.7–4.9)

## 2022-09-26 LAB
LEVETIRACETAM SERPL-MCNC: 2.5 UG/ML (ref 10–40)
VIT A SERPL-MCNC: 17.1 UG/DL (ref 18.9–57.3)
VIT B6 SERPL-MCNC: 14.3 UG/L (ref 3.4–65.2)

## 2022-10-03 RX ORDER — PLANT STANOL ESTER 450 MG
25000 TABLET ORAL DAILY
Qty: 30 CAPSULE | Refills: 11 | Status: CANCELLED | OUTPATIENT
Start: 2022-10-03 | End: 2023-10-03

## 2023-02-08 ENCOUNTER — OFFICE VISIT (OUTPATIENT)
Dept: ENDOCRINOLOGY | Facility: CLINIC | Age: 23
End: 2023-02-08
Payer: COMMERCIAL

## 2023-02-08 ENCOUNTER — LAB (OUTPATIENT)
Dept: LAB | Facility: HOSPITAL | Age: 23
End: 2023-02-08
Payer: COMMERCIAL

## 2023-02-08 VITALS
HEIGHT: 66 IN | OXYGEN SATURATION: 96 % | BODY MASS INDEX: 20.89 KG/M2 | SYSTOLIC BLOOD PRESSURE: 110 MMHG | WEIGHT: 130 LBS | DIASTOLIC BLOOD PRESSURE: 68 MMHG | HEART RATE: 64 BPM

## 2023-02-08 DIAGNOSIS — G40.909 SEIZURE DISORDER: ICD-10-CM

## 2023-02-08 DIAGNOSIS — E50.9 VITAMIN A DEFICIENCY: ICD-10-CM

## 2023-02-08 DIAGNOSIS — N30.10 INTERSTITIAL CYSTITIS: ICD-10-CM

## 2023-02-08 DIAGNOSIS — E10.649 TYPE 1 DIABETES MELLITUS WITH HYPOGLYCEMIA AND WITHOUT COMA: Primary | ICD-10-CM

## 2023-02-08 DIAGNOSIS — E56.0 VITAMIN E DEFICIENCY: ICD-10-CM

## 2023-02-08 DIAGNOSIS — E55.9 VITAMIN D DEFICIENCY: ICD-10-CM

## 2023-02-08 LAB
ALBUMIN SERPL-MCNC: 4.6 G/DL (ref 3.5–5.2)
ALBUMIN/GLOB SERPL: 1.8 G/DL
ALP SERPL-CCNC: 134 U/L (ref 39–117)
ALT SERPL W P-5'-P-CCNC: 14 U/L (ref 1–41)
ANION GAP SERPL CALCULATED.3IONS-SCNC: 4 MMOL/L (ref 5–15)
AST SERPL-CCNC: 17 U/L (ref 1–40)
BASOPHILS # BLD AUTO: 0.03 10*3/MM3 (ref 0–0.2)
BASOPHILS NFR BLD AUTO: 0.9 % (ref 0–1.5)
BILIRUB SERPL-MCNC: 0.4 MG/DL (ref 0–1.2)
BUN SERPL-MCNC: 7 MG/DL (ref 6–20)
BUN/CREAT SERPL: 11.1 (ref 7–25)
CALCIUM SPEC-SCNC: 9.6 MG/DL (ref 8.6–10.5)
CHLORIDE SERPL-SCNC: 102 MMOL/L (ref 98–107)
CO2 SERPL-SCNC: 33 MMOL/L (ref 22–29)
CREAT SERPL-MCNC: 0.63 MG/DL (ref 0.76–1.27)
DEPRECATED RDW RBC AUTO: 39.8 FL (ref 37–54)
EGFRCR SERPLBLD CKD-EPI 2021: 136.2 ML/MIN/1.73
EOSINOPHIL # BLD AUTO: 0.08 10*3/MM3 (ref 0–0.4)
EOSINOPHIL NFR BLD AUTO: 2.3 % (ref 0.3–6.2)
ERYTHROCYTE [DISTWIDTH] IN BLOOD BY AUTOMATED COUNT: 13.1 % (ref 12.3–15.4)
GLOBULIN UR ELPH-MCNC: 2.6 GM/DL
GLUCOSE SERPL-MCNC: 169 MG/DL (ref 65–99)
HBA1C MFR BLD: 5.7 % (ref 4.8–5.6)
HCT VFR BLD AUTO: 48.3 % (ref 37.5–51)
HGB BLD-MCNC: 16.1 G/DL (ref 13–17.7)
IMM GRANULOCYTES # BLD AUTO: 0 10*3/MM3 (ref 0–0.05)
IMM GRANULOCYTES NFR BLD AUTO: 0 % (ref 0–0.5)
LYMPHOCYTES # BLD AUTO: 1.19 10*3/MM3 (ref 0.7–3.1)
LYMPHOCYTES NFR BLD AUTO: 34.9 % (ref 19.6–45.3)
MCH RBC QN AUTO: 28.1 PG (ref 26.6–33)
MCHC RBC AUTO-ENTMCNC: 33.3 G/DL (ref 31.5–35.7)
MCV RBC AUTO: 84.4 FL (ref 79–97)
MONOCYTES # BLD AUTO: 0.3 10*3/MM3 (ref 0.1–0.9)
MONOCYTES NFR BLD AUTO: 8.8 % (ref 5–12)
NEUTROPHILS NFR BLD AUTO: 1.81 10*3/MM3 (ref 1.7–7)
NEUTROPHILS NFR BLD AUTO: 53.1 % (ref 42.7–76)
NRBC BLD AUTO-RTO: 0 /100 WBC (ref 0–0.2)
PLATELET # BLD AUTO: 204 10*3/MM3 (ref 140–450)
PMV BLD AUTO: 10.5 FL (ref 6–12)
POTASSIUM SERPL-SCNC: 4.5 MMOL/L (ref 3.5–5.2)
PROT SERPL-MCNC: 7.2 G/DL (ref 6–8.5)
RBC # BLD AUTO: 5.72 10*6/MM3 (ref 4.14–5.8)
SODIUM SERPL-SCNC: 139 MMOL/L (ref 136–145)
WBC NRBC COR # BLD: 3.41 10*3/MM3 (ref 3.4–10.8)

## 2023-02-08 PROCEDURE — 80053 COMPREHEN METABOLIC PANEL: CPT | Performed by: INTERNAL MEDICINE

## 2023-02-08 PROCEDURE — 85025 COMPLETE CBC W/AUTO DIFF WBC: CPT | Performed by: INTERNAL MEDICINE

## 2023-02-08 PROCEDURE — 99214 OFFICE O/P EST MOD 30 MIN: CPT | Performed by: INTERNAL MEDICINE

## 2023-02-08 PROCEDURE — 36415 COLL VENOUS BLD VENIPUNCTURE: CPT | Performed by: INTERNAL MEDICINE

## 2023-02-08 PROCEDURE — 84446 ASSAY OF VITAMIN E: CPT | Performed by: INTERNAL MEDICINE

## 2023-02-08 PROCEDURE — 84590 ASSAY OF VITAMIN A: CPT | Performed by: INTERNAL MEDICINE

## 2023-02-08 PROCEDURE — 83036 HEMOGLOBIN GLYCOSYLATED A1C: CPT | Performed by: INTERNAL MEDICINE

## 2023-02-08 RX ORDER — INSULIN PMP CART,AUT,G6/7,CNTR
1 EACH SUBCUTANEOUS
Qty: 10 EACH | Refills: 11 | Status: SHIPPED | OUTPATIENT
Start: 2023-02-08

## 2023-02-08 RX ORDER — INSULIN PMP CART,AUT,G6/7,CNTR
1 EACH SUBCUTANEOUS ONCE
Qty: 1 KIT | Refills: 1 | Status: SHIPPED | OUTPATIENT
Start: 2023-02-08 | End: 2023-02-08

## 2023-02-08 NOTE — PROGRESS NOTES
" Tomas Novak is a 22 y.o. male who presents for  evaluation of type 1 diabetes                                      Mode of Visit: Video  Location of patient: Home  You have chosen to receive care through a telehealth visit.  Does the patient consent to use a video/audio connection for your medical care today? Yes  The visit included audio and video interaction. No technical issues occurred during this visit                                    Primary Care / Referring Provider  Marcia Laureano MD      Duration since 3 years old    Timing - Diabetes is Constant    Quality - Aic at goal      Severity -  moderate    Complications - none    Current symptoms/problems  Urgency and frequency w improvement     Alleviating Factors: Compliance      Side Effects  none    Current diet  in general, a \"healthy\" diet      Current exercise exercise    Current monitoring regimen: home blood tests - using dexcom        Objective:     /68   Pulse 64   Ht 167.6 cm (66\")   Wt 59 kg (130 lb)   SpO2 96%   BMI 20.98 kg/m²   AOx3  No goiter , no bruit  Skin acne  RRR  CTA  Abdomen , soft NT  Ext no edema  Nl pulses     Lab Review      Component      Latest Ref Rng & Units 10/20/2017   C-Peptide      1.1 - 4.4 ng/mL <0.1 (L)         Assessment & Plan       ICD-10-CM ICD-9-CM   1. Type 1 diabetes mellitus with hypoglycemia and without coma (HCC)  E10.649 250.81   2. Seizure disorder (HCC)  G40.909 345.90   3. Interstitial cystitis  N30.10 595.1   4. Vitamin D deficiency  E55.9 268.9   5. Vitamin E deficiency  E56.0 269.1   6. Vitamin A deficiency  E50.9 264.9       Glycemic Management:   Lab Results   Component Value Date    HGBA1C 5.60 09/20/2022    HGBA1C 5.70 (H) 02/25/2022    HGBA1C 6.19 (H) 07/01/2021     Lab Results   Component Value Date    GLUCOSE 114 (H) 09/20/2022    BUN 10 09/20/2022    CREATININE 0.68 (L) 09/20/2022    EGFRIFNONA 145 02/25/2022    BCR 14.7 09/20/2022    CO2 32.0 (H) 09/20/2022    CALCIUM 9.4 " 09/20/2022    ALBUMIN 4.60 09/20/2022    AST 20 09/20/2022    ALT 15 09/20/2022     Lab Results   Component Value Date    WBC 4.99 09/20/2022    HGB 15.6 09/20/2022    HCT 46.0 09/20/2022    HCT 46.9 09/20/2022    MCV 85.0 09/20/2022     09/20/2022       Ambulatory Glucose Profile Report    Days Analyzed : 2 week period ending on 02/08/23      Continuous Glucose Monitory Device:  Dexcom G6     - 2% very high target range  - 12% high target range  - 78% in target range  - 8% below target range  - GMI 6.4 %  - Average glucose 128 mg/dl    Interpretation : Diabetes Type 1 with hypoglycemia              Humalog    1 unit per 5 grams     1 to 12.5 correction ( 8 /100)    --      Tresiba 20 units      Approve for omnipod 5      Basal 0.65 units per hour    Carb ratio 1:6     Correction 1:25     Duration of action : 5 hours     Target     120     Correct above     140   Lipid Management  Lab Results   Component Value Date    CHOL 120 09/20/2022    CHOL 114 12/29/2020    CHOL 108 06/30/2020     Lab Results   Component Value Date    TRIG 72 09/20/2022    TRIG 58 12/29/2020    TRIG 109 06/30/2020     Lab Results   Component Value Date    HDL 40 09/20/2022    HDL 37 (L) 12/29/2020    HDL 36 (L) 06/30/2020     No components found for: LDLCALC  Lab Results   Component Value Date    LDL 65 09/20/2022    LDL 64 12/29/2020    LDL 50 06/30/2020           Blood Pressure Management:    Vitals:    02/08/23 1048   BP: 110/68   Pulse: 64   SpO2: 96%       Lab Results   Component Value Date    GLUCOSE 114 (H) 09/20/2022    BUN 10 09/20/2022    CREATININE 0.68 (L) 09/20/2022    EGFRIFNONA 145 02/25/2022    BCR 14.7 09/20/2022    CO2 32.0 (H) 09/20/2022    CALCIUM 9.4 09/20/2022    ALBUMIN 4.60 09/20/2022    AST 20 09/20/2022    ALT 15 09/20/2022       Urinary Retention  Issues emptying bladder, I consider has interstitial cystitis       Refer to urologist     Microvascular Complication Monitoring:      No complications      Immunizations:      No flu shot     Preventive Care:      Nonsmoking     Weight Related:   Wt Readings from Last 3 Encounters:   02/08/23 59 kg (130 lb)   02/25/22 60.8 kg (134 lb)   08/31/21 59.5 kg (131 lb 1.6 oz)     Body mass index is 20.98 kg/m².        Bone Health    Lab Results   Component Value Date    CALCIUM 9.4 09/20/2022       Lab Results   Component Value Date    TEJG12MT 24.2 (L) 09/20/2022    VLCL73QV 54.1 12/29/2020    SBTO95AZ 30.7 06/30/2020          on seizure meds.     vit D 50 th units weekly       Thyroid Health  Lab Results   Component Value Date    TSH 1.490 09/20/2022           Other Diabetes Related Aspects       Lab Results   Component Value Date    EYPCKTWJ39 1,265 (H) 09/20/2022           Seizure disorder     Refilled Keppra , low levels, refer to neurology   Component      Latest Ref Rng & Units 9/20/2022   Levetiracetam      10.0 - 40.0 ug/mL 2.5 (L)         Hair loss= telogen effluvium , veget diet ,. Measure below     He has low vitamin A and E   I will ask for Multivitamin but not individual replacement           This document has been electronically signed by Lenin Davis MD on February 8, 2023 11:49 CST

## 2023-02-08 NOTE — PATIENT INSTRUCTIONS
Basal 0.65 units per hour    Carb ratio 1:6     Correction 1:25     Duration of action : 5 hours     Target     120     Correct above     140

## 2023-02-14 LAB
A-TOCOPHEROL VIT E SERPL-MCNC: 7.4 MG/L (ref 5.9–19.4)
GAMMA TOCOPHEROL SERPL-MCNC: 0.2 MG/L (ref 0.7–4.9)
VIT A SERPL-MCNC: 10.6 UG/DL (ref 18.9–57.3)

## 2023-02-16 RX ORDER — INSULIN PMP CART,AUT,G6/7,CNTR
1 EACH SUBCUTANEOUS ONCE
Qty: 1 KIT | Refills: 1 | Status: SHIPPED | OUTPATIENT
Start: 2023-02-16 | End: 2023-02-16

## 2023-03-19 DIAGNOSIS — E56.0 VITAMIN E DEFICIENCY: Primary | ICD-10-CM

## 2023-03-19 DIAGNOSIS — E10.649 TYPE 1 DIABETES MELLITUS WITH HYPOGLYCEMIA AND WITHOUT COMA: ICD-10-CM

## 2023-03-19 DIAGNOSIS — E61.1 IRON DEFICIENCY: ICD-10-CM

## 2023-03-19 DIAGNOSIS — E61.8 MINERAL DEFICIENCY: ICD-10-CM

## 2023-03-19 DIAGNOSIS — E50.9 VITAMIN A DEFICIENCY: ICD-10-CM

## 2023-03-19 DIAGNOSIS — E55.9 VITAMIN D DEFICIENCY: ICD-10-CM

## 2023-04-26 ENCOUNTER — LAB (OUTPATIENT)
Dept: LAB | Facility: HOSPITAL | Age: 23
End: 2023-04-26
Payer: COMMERCIAL

## 2023-04-26 LAB
ALBUMIN SERPL-MCNC: 4.7 G/DL (ref 3.5–5.2)
ALBUMIN/GLOB SERPL: 1.6 G/DL
ALP SERPL-CCNC: 146 U/L (ref 39–117)
ALT SERPL W P-5'-P-CCNC: 15 U/L (ref 1–41)
ANION GAP SERPL CALCULATED.3IONS-SCNC: 11 MMOL/L (ref 5–15)
AST SERPL-CCNC: 19 U/L (ref 1–40)
BASOPHILS # BLD AUTO: 0.06 10*3/MM3 (ref 0–0.2)
BASOPHILS NFR BLD AUTO: 1.2 % (ref 0–1.5)
BILIRUB SERPL-MCNC: 0.6 MG/DL (ref 0–1.2)
BUN SERPL-MCNC: 9 MG/DL (ref 6–20)
BUN/CREAT SERPL: 14.5 (ref 7–25)
CALCIUM SPEC-SCNC: 9.6 MG/DL (ref 8.6–10.5)
CHLORIDE SERPL-SCNC: 104 MMOL/L (ref 98–107)
CHOLEST SERPL-MCNC: 116 MG/DL (ref 0–200)
CO2 SERPL-SCNC: 27 MMOL/L (ref 22–29)
CREAT SERPL-MCNC: 0.62 MG/DL (ref 0.76–1.27)
DEPRECATED RDW RBC AUTO: 37 FL (ref 37–54)
EGFRCR SERPLBLD CKD-EPI 2021: 136.9 ML/MIN/1.73
EOSINOPHIL # BLD AUTO: 0.14 10*3/MM3 (ref 0–0.4)
EOSINOPHIL NFR BLD AUTO: 2.9 % (ref 0.3–6.2)
ERYTHROCYTE [DISTWIDTH] IN BLOOD BY AUTOMATED COUNT: 12.3 % (ref 12.3–15.4)
GLOBULIN UR ELPH-MCNC: 2.9 GM/DL
GLUCOSE SERPL-MCNC: 55 MG/DL (ref 65–99)
HBA1C MFR BLD: 5.5 % (ref 4.8–5.6)
HCT VFR BLD AUTO: 48.4 % (ref 37.5–51)
HDLC SERPL-MCNC: 41 MG/DL (ref 40–60)
HGB BLD-MCNC: 16.6 G/DL (ref 13–17.7)
IMM GRANULOCYTES # BLD AUTO: 0.01 10*3/MM3 (ref 0–0.05)
IMM GRANULOCYTES NFR BLD AUTO: 0.2 % (ref 0–0.5)
LDLC SERPL CALC-MCNC: 63 MG/DL (ref 0–100)
LDLC/HDLC SERPL: 1.57 {RATIO}
LYMPHOCYTES # BLD AUTO: 1.26 10*3/MM3 (ref 0.7–3.1)
LYMPHOCYTES NFR BLD AUTO: 26.1 % (ref 19.6–45.3)
MCH RBC QN AUTO: 28.9 PG (ref 26.6–33)
MCHC RBC AUTO-ENTMCNC: 34.3 G/DL (ref 31.5–35.7)
MCV RBC AUTO: 84.2 FL (ref 79–97)
MONOCYTES # BLD AUTO: 0.36 10*3/MM3 (ref 0.1–0.9)
MONOCYTES NFR BLD AUTO: 7.5 % (ref 5–12)
NEUTROPHILS NFR BLD AUTO: 3 10*3/MM3 (ref 1.7–7)
NEUTROPHILS NFR BLD AUTO: 62.1 % (ref 42.7–76)
NRBC BLD AUTO-RTO: 0 /100 WBC (ref 0–0.2)
PLATELET # BLD AUTO: 210 10*3/MM3 (ref 140–450)
PMV BLD AUTO: 11 FL (ref 6–12)
POTASSIUM SERPL-SCNC: 3.7 MMOL/L (ref 3.5–5.2)
PROT SERPL-MCNC: 7.6 G/DL (ref 6–8.5)
RBC # BLD AUTO: 5.75 10*6/MM3 (ref 4.14–5.8)
SODIUM SERPL-SCNC: 142 MMOL/L (ref 136–145)
TRIGL SERPL-MCNC: 53 MG/DL (ref 0–150)
TSH SERPL DL<=0.05 MIU/L-ACNC: 2.06 UIU/ML (ref 0.27–4.2)
VLDLC SERPL-MCNC: 12 MG/DL (ref 5–40)
WBC NRBC COR # BLD: 4.83 10*3/MM3 (ref 3.4–10.8)

## 2023-04-26 PROCEDURE — 85014 HEMATOCRIT: CPT | Performed by: INTERNAL MEDICINE

## 2023-04-26 PROCEDURE — 82607 VITAMIN B-12: CPT | Performed by: INTERNAL MEDICINE

## 2023-04-26 PROCEDURE — 84466 ASSAY OF TRANSFERRIN: CPT | Performed by: INTERNAL MEDICINE

## 2023-04-26 PROCEDURE — 80061 LIPID PANEL: CPT | Performed by: INTERNAL MEDICINE

## 2023-04-26 PROCEDURE — 84255 ASSAY OF SELENIUM: CPT | Performed by: INTERNAL MEDICINE

## 2023-04-26 PROCEDURE — 84630 ASSAY OF ZINC: CPT | Performed by: INTERNAL MEDICINE

## 2023-04-26 PROCEDURE — 83540 ASSAY OF IRON: CPT | Performed by: INTERNAL MEDICINE

## 2023-04-26 PROCEDURE — 85025 COMPLETE CBC W/AUTO DIFF WBC: CPT | Performed by: INTERNAL MEDICINE

## 2023-04-26 PROCEDURE — 82525 ASSAY OF COPPER: CPT | Performed by: INTERNAL MEDICINE

## 2023-04-26 PROCEDURE — 82306 VITAMIN D 25 HYDROXY: CPT | Performed by: INTERNAL MEDICINE

## 2023-04-26 PROCEDURE — 83036 HEMOGLOBIN GLYCOSYLATED A1C: CPT | Performed by: INTERNAL MEDICINE

## 2023-04-26 PROCEDURE — 84446 ASSAY OF VITAMIN E: CPT | Performed by: INTERNAL MEDICINE

## 2023-04-26 PROCEDURE — 84590 ASSAY OF VITAMIN A: CPT | Performed by: INTERNAL MEDICINE

## 2023-04-26 PROCEDURE — 84443 ASSAY THYROID STIM HORMONE: CPT | Performed by: INTERNAL MEDICINE

## 2023-04-26 PROCEDURE — 82570 ASSAY OF URINE CREATININE: CPT | Performed by: INTERNAL MEDICINE

## 2023-04-26 PROCEDURE — 82747 ASSAY OF FOLIC ACID RBC: CPT | Performed by: INTERNAL MEDICINE

## 2023-04-26 PROCEDURE — 80053 COMPREHEN METABOLIC PANEL: CPT | Performed by: INTERNAL MEDICINE

## 2023-04-26 PROCEDURE — 84207 ASSAY OF VITAMIN B-6: CPT | Performed by: INTERNAL MEDICINE

## 2023-04-26 PROCEDURE — 82043 UR ALBUMIN QUANTITATIVE: CPT | Performed by: INTERNAL MEDICINE

## 2023-04-27 LAB
25(OH)D3 SERPL-MCNC: 56.4 NG/ML (ref 30–100)
ALBUMIN UR-MCNC: <1.2 MG/DL
CREAT UR-MCNC: 120.4 MG/DL
IRON 24H UR-MRATE: 88 MCG/DL (ref 59–158)
IRON SATN MFR SERPL: 22 % (ref 20–50)
MICROALBUMIN/CREAT UR: NORMAL MG/G{CREAT}
TIBC SERPL-MCNC: 402 MCG/DL (ref 298–536)
TRANSFERRIN SERPL-MCNC: 270 MG/DL (ref 200–360)
VIT B12 BLD-MCNC: 1504 PG/ML (ref 211–946)

## 2023-04-28 ENCOUNTER — DOCUMENTATION (OUTPATIENT)
Dept: ENDOCRINOLOGY | Facility: CLINIC | Age: 23
End: 2023-04-28
Payer: COMMERCIAL

## 2023-04-28 LAB
FOLATE BLD-MCNC: >620 NG/ML
FOLATE RBC-MCNC: >1230 NG/ML
HCT VFR BLD AUTO: 50.4 % (ref 37.5–51)

## 2023-04-29 LAB — ZINC SERPL-MCNC: 73 UG/DL (ref 44–115)

## 2023-05-01 LAB
PYRIDOXAL PHOS SERPL-MCNC: 23.9 UG/L (ref 3.4–65.2)
SELENIUM SERPL-MCNC: 132 UG/L (ref 93–198)

## 2023-05-02 ENCOUNTER — TELEMEDICINE (OUTPATIENT)
Dept: ENDOCRINOLOGY | Facility: CLINIC | Age: 23
End: 2023-05-02
Payer: COMMERCIAL

## 2023-05-02 DIAGNOSIS — E50.9 VITAMIN A DEFICIENCY: ICD-10-CM

## 2023-05-02 DIAGNOSIS — E56.0 VITAMIN E DEFICIENCY: Primary | ICD-10-CM

## 2023-05-02 DIAGNOSIS — E10.649 TYPE 1 DIABETES MELLITUS WITH HYPOGLYCEMIA AND WITHOUT COMA: ICD-10-CM

## 2023-05-02 LAB — COPPER SERPL-MCNC: 71 UG/DL (ref 63–121)

## 2023-05-02 NOTE — PROGRESS NOTES
" Tomas Novak is a 22 y.o. male who presents for  evaluation of type 1 diabetes                                      Mode of Visit: Video  Location of patient: Home  You have chosen to receive care through a telehealth visit.  Does the patient consent to use a video/audio connection for your medical care today? Yes  The visit included audio and video interaction. No technical issues occurred during this visit                                    Primary Care / Referring Provider  Marcia Laureano MD      Duration since 3 years old    Timing - Diabetes is Constant    Quality - Aic at goal      Severity -  moderate    Complications - none    Current symptoms/problems  Urgency and frequency w improvement     Alleviating Factors: Compliance      Side Effects  none    Current diet  in general, a \"healthy\" diet      Current exercise exercise    Current monitoring regimen: home blood tests - using dexcom        Objective:     There were no vitals taken for this visit.  AOx3  No goiter , no bruit  Skin acne  RRR  CTA  Abdomen , soft NT  Ext no edema  Nl pulses     Lab Review      Component      Latest Ref Rng & Units 10/20/2017   C-Peptide      1.1 - 4.4 ng/mL <0.1 (L)         Assessment & Plan       ICD-10-CM ICD-9-CM   1. Vitamin E deficiency  E56.0 269.1   2. Vitamin A deficiency  E50.9 264.9   3. Type 1 diabetes mellitus with hypoglycemia and without coma  E10.649 250.81       Glycemic Management:   Lab Results   Component Value Date    HGBA1C 5.50 04/26/2023    HGBA1C 5.70 (H) 02/08/2023    HGBA1C 5.60 09/20/2022     Lab Results   Component Value Date    GLUCOSE 55 (L) 04/26/2023    BUN 9 04/26/2023    CREATININE 0.62 (L) 04/26/2023    EGFRIFNONA 145 02/25/2022    BCR 14.5 04/26/2023    CO2 27.0 04/26/2023    CALCIUM 9.6 04/26/2023    ALBUMIN 4.7 04/26/2023    AST 19 04/26/2023    ALT 15 04/26/2023     Lab Results   Component Value Date    WBC 4.83 04/26/2023    HGB 16.6 04/26/2023    HCT 48.4 04/26/2023    HCT " 50.4 04/26/2023    MCV 84.2 04/26/2023     04/26/2023            Using dexcom    Not connected for me to see data remotely       Humalog    1 unit per 5 grams     1 to 12.5 correction ( 8 /100)    --      Tresiba 20 units      Didn't like Omnipod 5   Lipid Management  Lab Results   Component Value Date    CHOL 116 04/26/2023    CHOL 120 09/20/2022    CHOL 114 12/29/2020     Lab Results   Component Value Date    TRIG 53 04/26/2023    TRIG 72 09/20/2022    TRIG 58 12/29/2020     Lab Results   Component Value Date    HDL 41 04/26/2023    HDL 40 09/20/2022    HDL 37 (L) 12/29/2020     No components found for: LDLCALC  Lab Results   Component Value Date    LDL 63 04/26/2023    LDL 65 09/20/2022    LDL 64 12/29/2020           Blood Pressure Management:    There were no vitals filed for this visit.    Lab Results   Component Value Date    GLUCOSE 55 (L) 04/26/2023    BUN 9 04/26/2023    CREATININE 0.62 (L) 04/26/2023    EGFRIFNONA 145 02/25/2022    BCR 14.5 04/26/2023    CO2 27.0 04/26/2023    CALCIUM 9.6 04/26/2023    ALBUMIN 4.7 04/26/2023    AST 19 04/26/2023    ALT 15 04/26/2023       Urinary Retention  Issues emptying bladder, I consider has interstitial cystitis       Refer to urologist     Microvascular Complication Monitoring:      No complications     Immunizations:      No flu shot     Preventive Care:      Nonsmoking     Weight Related:   Wt Readings from Last 3 Encounters:   02/08/23 59 kg (130 lb)   02/25/22 60.8 kg (134 lb)   08/31/21 59.5 kg (131 lb 1.6 oz)     There is no height or weight on file to calculate BMI.        Bone Health    Lab Results   Component Value Date    CALCIUM 9.6 04/26/2023       Lab Results   Component Value Date    WIYM03LI 56.4 04/26/2023    DQBY70TB 24.2 (L) 09/20/2022    OPXM38RE 54.1 12/29/2020          on seizure meds.     vit D 50 th units weekly       Thyroid Health  Lab Results   Component Value Date    TSH 2.060 04/26/2023           Other Diabetes Related Aspects        Lab Results   Component Value Date    GIQRIOTA67 1,504 (H) 04/26/2023           Seizure disorder     Refilled Keppra , low levels, refer to neurology   Component      Latest Ref Rng & Units 9/20/2022   Levetiracetam      10.0 - 40.0 ug/mL 2.5 (L)          He has low vitamin A and E   Has introduced veggies and fruits to his diet    Pending           This document has been electronically signed by Lenin Davis MD on May 2, 2023 13:33 CDT

## 2023-05-03 LAB
A-TOCOPHEROL VIT E SERPL-MCNC: 6 MG/L (ref 5.9–19.4)
GAMMA TOCOPHEROL SERPL-MCNC: 0.3 MG/L (ref 0.7–4.9)

## 2023-05-04 DIAGNOSIS — E50.9 VITAMIN A DEFICIENCY: ICD-10-CM

## 2023-05-04 DIAGNOSIS — E56.0 VITAMIN E DEFICIENCY: Primary | ICD-10-CM

## 2023-05-04 DIAGNOSIS — E10.649 TYPE 1 DIABETES MELLITUS WITH HYPOGLYCEMIA AND WITHOUT COMA: ICD-10-CM

## 2023-05-04 LAB — VIT A SERPL-MCNC: 15.6 UG/DL (ref 18.9–57.3)

## 2023-05-05 ENCOUNTER — TELEPHONE (OUTPATIENT)
Dept: ENDOCRINOLOGY | Facility: CLINIC | Age: 23
End: 2023-05-05
Payer: COMMERCIAL

## 2023-05-05 NOTE — TELEPHONE ENCOUNTER
----- Message from Lenin Davis MD sent at 5/4/2023 11:54 PM CDT -----  Please let him know that his vitamin A has improved from 10 to 16 and normal is 19 .    He told me he has incorporated more vegetables to his diet and it has improved his levels in a healthier way than supplements.     Please continue this habit and I will ask him to repeaet labs 1 week before next appt to monitor levels.